# Patient Record
Sex: FEMALE | Race: WHITE | Employment: OTHER | URBAN - METROPOLITAN AREA
[De-identification: names, ages, dates, MRNs, and addresses within clinical notes are randomized per-mention and may not be internally consistent; named-entity substitution may affect disease eponyms.]

---

## 2019-06-26 ENCOUNTER — HOSPITAL ENCOUNTER (OUTPATIENT)
Dept: MRI IMAGING | Age: 59
Discharge: HOME | End: 2019-06-26
Attending: OTOLARYNGOLOGY
Payer: COMMERCIAL

## 2019-06-26 DIAGNOSIS — H90.5 SNHL (SENSORINEURAL HEARING LOSS): ICD-10-CM

## 2019-06-26 DIAGNOSIS — G43.109 MIGRAINE WITH VERTIGO: ICD-10-CM

## 2019-06-26 DIAGNOSIS — R51.9 HEADACHE: ICD-10-CM

## 2019-06-26 PROCEDURE — 74011250636 HC RX REV CODE- 250/636: Performed by: OTOLARYNGOLOGY

## 2019-06-26 PROCEDURE — A9575 INJ GADOTERATE MEGLUMI 0.1ML: HCPCS | Performed by: OTOLARYNGOLOGY

## 2019-06-26 PROCEDURE — 70553 MRI BRAIN STEM W/O & W/DYE: CPT

## 2019-06-26 RX ORDER — GADOTERATE MEGLUMINE 376.9 MG/ML
13 INJECTION INTRAVENOUS
Status: COMPLETED | OUTPATIENT
Start: 2019-06-26 | End: 2019-06-26

## 2019-06-26 RX ADMIN — GADOTERATE MEGLUMINE 15 ML: 376.9 INJECTION INTRAVENOUS at 16:42

## 2022-06-08 ENCOUNTER — HOSPITAL ENCOUNTER (INPATIENT)
Age: 62
LOS: 2 days | Discharge: HOME | DRG: 885 | End: 2022-06-10
Attending: EMERGENCY MEDICINE | Admitting: PSYCHIATRY & NEUROLOGY
Payer: COMMERCIAL

## 2022-06-08 DIAGNOSIS — F33.2 SEVERE EPISODE OF RECURRENT MAJOR DEPRESSIVE DISORDER, WITHOUT PSYCHOTIC FEATURES (HCC): ICD-10-CM

## 2022-06-08 DIAGNOSIS — F41.1 GENERALIZED ANXIETY DISORDER: Primary | ICD-10-CM

## 2022-06-08 PROBLEM — F32.A DEPRESSED: Status: ACTIVE | Noted: 2022-06-08

## 2022-06-08 LAB
ALBUMIN SERPL-MCNC: 3.6 G/DL (ref 3.5–5)
ALBUMIN/GLOB SERPL: 0.9 {RATIO} (ref 1.1–2.2)
ALP SERPL-CCNC: 151 U/L (ref 45–117)
ALT SERPL-CCNC: 171 U/L (ref 12–78)
AMPHET UR QL SCN: NEGATIVE
ANION GAP SERPL CALC-SCNC: 9 MMOL/L (ref 5–15)
APAP SERPL-MCNC: <2 UG/ML (ref 10–30)
APPEARANCE UR: CLEAR
AST SERPL-CCNC: 64 U/L (ref 15–37)
BARBITURATES UR QL SCN: NEGATIVE
BASOPHILS # BLD: 0 K/UL (ref 0–0.1)
BASOPHILS NFR BLD: 0 % (ref 0–1)
BENZODIAZ UR QL: NEGATIVE
BILIRUB SERPL-MCNC: 0.4 MG/DL (ref 0.2–1)
BILIRUB UR QL: NEGATIVE
BUN SERPL-MCNC: 6 MG/DL (ref 6–20)
BUN/CREAT SERPL: 7 (ref 12–20)
CALCIUM SERPL-MCNC: 9 MG/DL (ref 8.5–10.1)
CANNABINOIDS UR QL SCN: NEGATIVE
CHLORIDE SERPL-SCNC: 105 MMOL/L (ref 97–108)
CO2 SERPL-SCNC: 26 MMOL/L (ref 21–32)
COCAINE UR QL SCN: NEGATIVE
COLOR UR: NORMAL
CREAT SERPL-MCNC: 0.84 MG/DL (ref 0.55–1.02)
DIFFERENTIAL METHOD BLD: ABNORMAL
DRUG SCRN COMMENT,DRGCM: NORMAL
EOSINOPHIL # BLD: 0 K/UL (ref 0–0.4)
EOSINOPHIL NFR BLD: 0 % (ref 0–7)
ERYTHROCYTE [DISTWIDTH] IN BLOOD BY AUTOMATED COUNT: 14.7 % (ref 11.5–14.5)
ETHANOL SERPL-MCNC: <10 MG/DL
FLUAV RNA SPEC QL NAA+PROBE: NOT DETECTED
FLUBV RNA SPEC QL NAA+PROBE: NOT DETECTED
GLOBULIN SER CALC-MCNC: 3.8 G/DL (ref 2–4)
GLUCOSE SERPL-MCNC: 89 MG/DL (ref 65–100)
GLUCOSE UR STRIP.AUTO-MCNC: NEGATIVE MG/DL
HCT VFR BLD AUTO: 41 % (ref 35–47)
HGB BLD-MCNC: 13.8 G/DL (ref 11.5–16)
HGB UR QL STRIP: NEGATIVE
IMM GRANULOCYTES # BLD AUTO: 0 K/UL (ref 0–0.04)
IMM GRANULOCYTES NFR BLD AUTO: 0 % (ref 0–0.5)
KETONES UR QL STRIP.AUTO: NEGATIVE MG/DL
LEUKOCYTE ESTERASE UR QL STRIP.AUTO: NEGATIVE
LYMPHOCYTES # BLD: 3.5 K/UL (ref 0.8–3.5)
LYMPHOCYTES NFR BLD: 45 % (ref 12–49)
MCH RBC QN AUTO: 29.9 PG (ref 26–34)
MCHC RBC AUTO-ENTMCNC: 33.7 G/DL (ref 30–36.5)
MCV RBC AUTO: 88.7 FL (ref 80–99)
METHADONE UR QL: NEGATIVE
MONOCYTES # BLD: 0.4 K/UL (ref 0–1)
MONOCYTES NFR BLD: 5 % (ref 5–13)
NEUTS SEG # BLD: 3.8 K/UL (ref 1.8–8)
NEUTS SEG NFR BLD: 50 % (ref 32–75)
NITRITE UR QL STRIP.AUTO: NEGATIVE
NRBC # BLD: 0 K/UL (ref 0–0.01)
NRBC BLD-RTO: 0 PER 100 WBC
OPIATES UR QL: NEGATIVE
PCP UR QL: NEGATIVE
PH UR STRIP: 6.5 [PH] (ref 5–8)
PLATELET # BLD AUTO: 255 K/UL (ref 150–400)
PMV BLD AUTO: 8.9 FL (ref 8.9–12.9)
POTASSIUM SERPL-SCNC: 3.9 MMOL/L (ref 3.5–5.1)
PROT SERPL-MCNC: 7.4 G/DL (ref 6.4–8.2)
PROT UR STRIP-MCNC: NEGATIVE MG/DL
RBC # BLD AUTO: 4.62 M/UL (ref 3.8–5.2)
RBC MORPH BLD: ABNORMAL
SALICYLATES SERPL-MCNC: 5.3 MG/DL (ref 2.8–20)
SARS-COV-2, COV2: NOT DETECTED
SODIUM SERPL-SCNC: 140 MMOL/L (ref 136–145)
SP GR UR REFRACTOMETRY: <1.005 (ref 1–1.03)
UROBILINOGEN UR QL STRIP.AUTO: 0.2 EU/DL (ref 0.2–1)
WBC # BLD AUTO: 7.7 K/UL (ref 3.6–11)

## 2022-06-08 PROCEDURE — 90791 PSYCH DIAGNOSTIC EVALUATION: CPT

## 2022-06-08 PROCEDURE — 81003 URINALYSIS AUTO W/O SCOPE: CPT

## 2022-06-08 PROCEDURE — 80053 COMPREHEN METABOLIC PANEL: CPT

## 2022-06-08 PROCEDURE — 85025 COMPLETE CBC W/AUTO DIFF WBC: CPT

## 2022-06-08 PROCEDURE — 80143 DRUG ASSAY ACETAMINOPHEN: CPT

## 2022-06-08 PROCEDURE — 80179 DRUG ASSAY SALICYLATE: CPT

## 2022-06-08 PROCEDURE — 65220000003 HC RM SEMIPRIVATE PSYCH

## 2022-06-08 PROCEDURE — 99285 EMERGENCY DEPT VISIT HI MDM: CPT

## 2022-06-08 PROCEDURE — 74011250637 HC RX REV CODE- 250/637: Performed by: EMERGENCY MEDICINE

## 2022-06-08 PROCEDURE — 80307 DRUG TEST PRSMV CHEM ANLYZR: CPT

## 2022-06-08 PROCEDURE — 74011250637 HC RX REV CODE- 250/637: Performed by: PSYCHIATRY & NEUROLOGY

## 2022-06-08 PROCEDURE — 82077 ASSAY SPEC XCP UR&BREATH IA: CPT

## 2022-06-08 PROCEDURE — 36415 COLL VENOUS BLD VENIPUNCTURE: CPT

## 2022-06-08 PROCEDURE — 87636 SARSCOV2 & INF A&B AMP PRB: CPT

## 2022-06-08 RX ORDER — IBUPROFEN 200 MG
1 TABLET ORAL
Status: DISCONTINUED | OUTPATIENT
Start: 2022-06-08 | End: 2022-06-09

## 2022-06-08 RX ORDER — CITALOPRAM 40 MG/1
40 TABLET, FILM COATED ORAL DAILY
Status: ON HOLD | COMMUNITY
Start: 2022-05-26 | End: 2022-06-10 | Stop reason: SDUPTHER

## 2022-06-08 RX ORDER — PANTOPRAZOLE SODIUM 40 MG/1
40 TABLET, DELAYED RELEASE ORAL DAILY
Status: ON HOLD | COMMUNITY
Start: 2022-05-20 | End: 2022-06-10 | Stop reason: SDUPTHER

## 2022-06-08 RX ORDER — DIPHENHYDRAMINE HYDROCHLORIDE 50 MG/ML
50 INJECTION, SOLUTION INTRAMUSCULAR; INTRAVENOUS
Status: DISCONTINUED | OUTPATIENT
Start: 2022-06-08 | End: 2022-06-10 | Stop reason: HOSPADM

## 2022-06-08 RX ORDER — BENZTROPINE MESYLATE 1 MG/1
1 TABLET ORAL
Status: DISCONTINUED | OUTPATIENT
Start: 2022-06-08 | End: 2022-06-10 | Stop reason: HOSPADM

## 2022-06-08 RX ORDER — HYDROXYZINE 25 MG/1
50 TABLET, FILM COATED ORAL
Status: DISCONTINUED | OUTPATIENT
Start: 2022-06-08 | End: 2022-06-10 | Stop reason: HOSPADM

## 2022-06-08 RX ORDER — ATORVASTATIN CALCIUM 20 MG/1
20 TABLET, FILM COATED ORAL DAILY
Status: ON HOLD | COMMUNITY
Start: 2022-03-03 | End: 2022-06-09

## 2022-06-08 RX ORDER — LORAZEPAM 1 MG/1
1 TABLET ORAL
Status: COMPLETED | OUTPATIENT
Start: 2022-06-08 | End: 2022-06-08

## 2022-06-08 RX ORDER — ACETAMINOPHEN 325 MG/1
650 TABLET ORAL
Status: DISCONTINUED | OUTPATIENT
Start: 2022-06-08 | End: 2022-06-10 | Stop reason: HOSPADM

## 2022-06-08 RX ORDER — LORAZEPAM 2 MG/ML
1 INJECTION INTRAMUSCULAR
Status: DISCONTINUED | OUTPATIENT
Start: 2022-06-08 | End: 2022-06-10 | Stop reason: HOSPADM

## 2022-06-08 RX ORDER — OLANZAPINE 2.5 MG/1
5 TABLET ORAL
Status: DISCONTINUED | OUTPATIENT
Start: 2022-06-08 | End: 2022-06-10 | Stop reason: HOSPADM

## 2022-06-08 RX ORDER — HALOPERIDOL 5 MG/ML
5 INJECTION INTRAMUSCULAR
Status: DISCONTINUED | OUTPATIENT
Start: 2022-06-08 | End: 2022-06-10 | Stop reason: HOSPADM

## 2022-06-08 RX ORDER — BACLOFEN 20 MG/1
20 TABLET ORAL AS NEEDED
Status: ON HOLD | COMMUNITY
Start: 2022-04-20 | End: 2022-06-09

## 2022-06-08 RX ORDER — TRAZODONE HYDROCHLORIDE 50 MG/1
50 TABLET ORAL
Status: DISCONTINUED | OUTPATIENT
Start: 2022-06-08 | End: 2022-06-10 | Stop reason: HOSPADM

## 2022-06-08 RX ORDER — ADHESIVE BANDAGE
30 BANDAGE TOPICAL DAILY PRN
Status: DISCONTINUED | OUTPATIENT
Start: 2022-06-08 | End: 2022-06-10 | Stop reason: HOSPADM

## 2022-06-08 RX ORDER — CLONAZEPAM 0.5 MG/1
0.5 TABLET ORAL AS DIRECTED
Status: ON HOLD | COMMUNITY
End: 2022-06-10 | Stop reason: SDUPTHER

## 2022-06-08 RX ORDER — IBUPROFEN 200 MG
1 TABLET ORAL DAILY
Status: DISCONTINUED | OUTPATIENT
Start: 2022-06-09 | End: 2022-06-08

## 2022-06-08 RX ADMIN — LORAZEPAM 1 MG: 1 TABLET ORAL at 14:26

## 2022-06-08 RX ADMIN — HYDROXYZINE HYDROCHLORIDE 50 MG: 25 TABLET, FILM COATED ORAL at 22:08

## 2022-06-08 RX ADMIN — TRAZODONE HYDROCHLORIDE 50 MG: 50 TABLET ORAL at 22:08

## 2022-06-08 NOTE — ED NOTES
TRANSFER - OUT REPORT:    Verbal report given to Carlos Martinez on Kelly Carol Ann  being transferred to [facility] for routine progression of care       Report consisted of patients Situation, Background, Assessment and   Recommendations(SBAR). Information from the following report(s) SBAR, Kardex, ED Summary, Procedure Summary, Intake/Output, MAR, Accordion and Med Rec Status was reviewed with the receiving nurse. Lines:       Opportunity for questions and clarification was provided.       Patient transported with:

## 2022-06-08 NOTE — BH NOTES
TRANSFER - IN REPORT:     Verbal report received from Alyssa Pereyra RN on Kendall Rivera being transferred from [facility] ER for routine progression of care        Report consisted of patients Situation, Background, Assessment and   Recommendations(SBAR). Information from the following reports SBAR, Kardex, ED Summary, MAR, Recent Results and Med Rec Status was reviewed with the receiving nurse.

## 2022-06-08 NOTE — ED TRIAGE NOTES
Pt reports increasing anxiety over the last 3 weeks. She states that her PCP has added atarax yesterday but that it is not helping. She reports extreme anxiety at this time. Denies any SI or HI, no hallucinations. Pt is anxious, but cooperative.

## 2022-06-08 NOTE — ED NOTES
Report from [facility] at [facility], pt to go to [address] [place] [address] Unit Bed 563G    Number for report - [phone] No

## 2022-06-08 NOTE — BSMART NOTE
BSmart aware of consultation, will address upon completion of another evaluation. Adriano Watson LCSW.

## 2022-06-08 NOTE — BSMART NOTE
BSMART assessment completed, and suicide risk level noted to be moderate. Primary Nurse Saman Castaneda who is also serving as the Charge Nurse and Physician Dr. Sherrill Reddy notified. Concerns not observed. Security/Off- has not been notified.     Ame Huffman, [facility]

## 2022-06-08 NOTE — BSMART NOTE
Comprehensive Assessment Form Part 1      Section I - Disposition    Depression and Anxiety  No past medical history on file. The Medical Doctor to Psychiatrist conference was not completed. The Medical Doctor is in agreement with Psychiatrist disposition because of (reason) patient meets criteria for vol inpatient admission. The plan is medical clearance and vol inpatient admission. Access notified. The on-call Psychiatrist consulted was  .  The admitting Psychiatrist will be Dr. Manjit Hardy. The admitting Diagnosis is Depression and anxiety. The Payor source is [facility] [id number]. This writer reviewed the [address] in nursing flowsheet and the risk level assigned is Moderate risk. Based on this assessment, the risk of suicide is moderate risk and the plan is medical clearance and vol inpatient admission. Section II - Integrated Summary  Summary:  Patient seen in room 7 of the ER. Patient alert and oriented, pleasant and engaging. Patient appeared anxious and overwhelmed AEB her expressions. She indicated that she had been on the same medication regimen managed by her PCP for the past 20 years and recently she had a virus about 2-3 weeks ago that brought about a change in her mental health stability that she has struggled to get over. Patient noted that she has been constantly shaking, panicky, had decreased eating, pacing, and had disrupted sleeping patterns making it difficult for her to function. Patient expressed that she went to the ER twice in the past week and received IV fluids for being dehydrated but nothing else was going on with her medically. Patient indicated that she has tried coping techniques but nothing is helping at this point. She further noted that her doctor added a new medication on yesterday and she has had 3 doses already but had not seen any changes thus far.  Patient stated can recall one prior instance years ago when she was unstable and the only thing that helped was an inpatient stay. Patient expressed that at this point, she feels that she is threat to herself and indicated, \"I don't want to live like this. I didn't want to overtake my meds but I need to get some help. I can't do this by myself. \" Patient is willing to stay for vol inpatient treatment. The patienthas demonstrated mental capacity to provide informed consent. The information is given by the patient. The Chief Complaint is increased anxiety. The Precipitant Factors are disruption to normal daily stability and functioning. Previous Hospitalizations: 1- [facility]  The patient has not previously been in restraints. Current Psychiatrist and/or  is none. Lethality Assessment:    The potential for suicide noted by the following: ideation . The potential for homicide is not noted. The patient has not been a perpetrator of sexual or physical abuse. There are not pending charges. The patient is felt to be at risk for self harm or harm to others. The attending nurse was advised to remove potentially harmful or dangerous items from the patient's room , to remove patient clothing and place it out of immediate access to the patient and the patient needs supervision. Section III - Psychosocial  The patient's overall mood and attitude is calm and cooperative. Feelings of helplessness and hopelessness are not observed. Generalized anxiety is observed by verbalizations. Panic is not observed. Phobias are not observed. Obsessive compulsive tendencies are not observed. Section IV - Mental Status Exam  The patient's appearance shows no evidence of impairment. The patient's behavior shows no evidence of impairment. The patient is oriented to time, place, person and situation. The patient's speech shows no evidence of impairment. The patient's mood is anxious. The range of affect shows no evidence of impairment.   The patient's thought content demonstrates no evidence of impairment. The thought process shows no evidence of impairment. The patient's perception shows no evidence of impairment. The patient's memory shows no evidence of impairment. The patient's appetite is decreased. The patient's sleep shows no evidence of impairment. The patient's insight shows no evidence of impairment. The patient's judgement shows no evidence of impairment. Section V - Substance Abuse  The patient is not using substances. Section VI - Living Arrangements  The patient is . The patient lives with a spouse. The patient has no children. The patient does plan to return home upon discharge. The patient does not have legal issues pending. The patient's source of income comes from unknown. Jainism and cultural practices have not been voiced at this time. The patient's greatest support comes from family and this person will be involved with the treatment. The patient has not been in an event described as horrible or outside the realm of ordinary life experience either currently or in the past.  The patient has not been a victim of sexual/physical abuse. Section VII - Other Areas of Clinical Concern  The highest grade achieved is unknown with the overall quality of school experience being described as unknown. The patient speaks [place] as a primary language. The patient has no communication impairments affecting communication. The patient's preference for learning can be described as: can read and write adequately.   The patient's hearing is normal.  The patient's vision is normal.      [facility]io [facility]

## 2022-06-08 NOTE — ED NOTES
Jen Ta at [facility] reports that the pt is a moderate risk d/t columbia scale. Pt is voluntary to stay.  Jen Ta is speaking with bedboard and Dr Nestor Ta

## 2022-06-08 NOTE — ED PROVIDER NOTES
EMERGENCY DEPARTMENT HISTORY AND PHYSICAL EXAM          Date: 6/8/2022  Patient Name: Tamia Jaimes    History of Presenting Illness     Chief Complaint   Patient presents with    Anxiety       History Provided By: Patient    HPI: Tamia Jaimes is a 58 y.o. female, pmhx listed below, who presents to the ED c/o severe anxiety. Patient reports severe anxiety for the last 3 weeks. States started with a viral infection and then subsequently she has felt constantly anxious/depressed. Reports she had a similar episode about 20 years ago and was started on Celexa and Klonopin at that time, continues to take both medications daily. States the anxiety is so severe she \"cannot live like this anymore\". Reports this is her third ER visit for similar symptoms. Spoke with her primary care doctor about symptoms and was prescribed hydroxyzine but has not had any relief since taking yesterday. Denies suicidal ideation or homicidal ideation. Denies hallucinations. PCP: Warren Tamayo MD    There are no other complaints, changes, or physical findings at this time. Past History       Past Medical History:  No past medical history on file. Past Surgical History:  No past surgical history on file. Family History:  No family history on file. Social History:  Social History     Tobacco Use    Smoking status: Not on file    Smokeless tobacco: Not on file   Substance Use Topics    Alcohol use: Not on file    Drug use: Not on file       Current Facility-Administered Medications   Medication Dose Route Frequency Provider Last Rate Last Admin    nicotine (NICODERM CQ) 14 mg/24 hr patch 1 Patch  1 Patch TransDERmal NOW Sally Bellamy MD   1 Patch at 06/08/22 8301     Current Outpatient Medications   Medication Sig Dispense Refill    atorvastatin (LIPITOR) 20 mg tablet Take 20 mg by mouth daily.  baclofen (LIORESAL) 20 mg tablet Take 20 mg by mouth as needed.       citalopram (CELEXA) 40 mg tablet Take 40 mg by mouth daily.  clonazePAM (KlonoPIN) 0.5 mg tablet Take 0.5 mg by mouth as directed.  pantoprazole (PROTONIX) 40 mg tablet Take 40 mg by mouth daily. Allergies:  No Known Allergies      Review of Systems   Review of Systems   Constitutional: Negative for chills and fever. HENT: Negative for congestion. Eyes: Negative for pain. Respiratory: Negative for shortness of breath. Cardiovascular: Negative for chest pain. Gastrointestinal: Negative for abdominal pain. Genitourinary: Negative for flank pain. Musculoskeletal: Negative for back pain. Neurological: Negative for headaches. Psychiatric/Behavioral: Positive for agitation. The patient is nervous/anxious. Physical Exam     Vital Signs-Reviewed the patient's vital signs. Patient Vitals for the past 12 hrs:   Temp Pulse Resp BP SpO2   06/08/22 1745 98.3 °F (36.8 °C) 81 18 129/75 98 %   06/08/22 1356 -- 82 -- 127/79 99 %   06/08/22 1134 98.3 °F (36.8 °C) 100 22 119/74 97 %       Physical Exam  Constitutional:       Appearance: Normal appearance. HENT:      Head: Normocephalic and atraumatic. Mouth/Throat:      Mouth: Mucous membranes are moist.   Eyes:      Pupils: Pupils are equal, round, and reactive to light. Cardiovascular:      Rate and Rhythm: Normal rate and regular rhythm. Pulmonary:      Effort: Pulmonary effort is normal.      Breath sounds: Normal breath sounds. Abdominal:      Tenderness: There is no abdominal tenderness. Musculoskeletal:         General: No swelling. Skin:     General: Skin is warm and dry. Neurological:      Mental Status: She is alert and oriented to person, place, and time. Psychiatric:         Mood and Affect: Mood is anxious. Speech: Speech is rapid and pressured. Behavior: Behavior is not agitated. Behavior is cooperative. Thought Content:  Thought content is not paranoid or delusional. Thought content does not include homicidal or suicidal ideation. Diagnostic Study Results     Labs -     Recent Results (from the past 12 hour(s))   CBC WITH AUTOMATED DIFF    Collection Time: 06/08/22 11:39 AM   Result Value Ref Range    WBC 7.7 3.6 - 11.0 K/uL    RBC 4.62 3.80 - 5.20 M/uL    HGB 13.8 11.5 - 16.0 g/dL    HCT 41.0 35.0 - 47.0 %    MCV 88.7 80.0 - 99.0 FL    MCH 29.9 26.0 - 34.0 PG    MCHC 33.7 30.0 - 36.5 g/dL    RDW 14.7 (H) 11.5 - 14.5 %    PLATELET 911 295 - 529 K/uL    MPV 8.9 8.9 - 12.9 FL    NRBC 0.0 0  WBC    ABSOLUTE NRBC 0.00 0.00 - 0.01 K/uL    NEUTROPHILS 50 32 - 75 %    LYMPHOCYTES 45 12 - 49 %    MONOCYTES 5 5 - 13 %    EOSINOPHILS 0 0 - 7 %    BASOPHILS 0 0 - 1 %    IMMATURE GRANULOCYTES 0 0.0 - 0.5 %    ABS. NEUTROPHILS 3.8 1.8 - 8.0 K/UL    ABS. LYMPHOCYTES 3.5 0.8 - 3.5 K/UL    ABS. MONOCYTES 0.4 0.0 - 1.0 K/UL    ABS. EOSINOPHILS 0.0 0.0 - 0.4 K/UL    ABS. BASOPHILS 0.0 0.0 - 0.1 K/UL    ABS. IMM. GRANS. 0.0 0.00 - 0.04 K/UL    DF MANUAL      RBC COMMENTS NORMOCYTIC, NORMOCHROMIC     METABOLIC PANEL, COMPREHENSIVE    Collection Time: 06/08/22 11:39 AM   Result Value Ref Range    Sodium 140 136 - 145 mmol/L    Potassium 3.9 3.5 - 5.1 mmol/L    Chloride 105 97 - 108 mmol/L    CO2 26 21 - 32 mmol/L    Anion gap 9 5 - 15 mmol/L    Glucose 89 65 - 100 mg/dL    BUN 6 6 - 20 MG/DL    Creatinine 0.84 0.55 - 1.02 MG/DL    BUN/Creatinine ratio 7 (L) 12 - 20      GFR est AA >60 >60 ml/min/1.73m2    GFR est non-AA >60 >60 ml/min/1.73m2    Calcium 9.0 8.5 - 10.1 MG/DL    Bilirubin, total 0.4 0.2 - 1.0 MG/DL    ALT (SGPT) 171 (H) 12 - 78 U/L    AST (SGOT) 64 (H) 15 - 37 U/L    Alk.  phosphatase 151 (H) 45 - 117 U/L    Protein, total 7.4 6.4 - 8.2 g/dL    Albumin 3.6 3.5 - 5.0 g/dL    Globulin 3.8 2.0 - 4.0 g/dL    A-G Ratio 0.9 (L) 1.1 - 2.2     ETHYL ALCOHOL    Collection Time: 06/08/22 11:39 AM   Result Value Ref Range    ALCOHOL(ETHYL),SERUM <10 <10 MG/DL   ACETAMINOPHEN    Collection Time: 06/08/22 11:39 AM Result Value Ref Range    Acetaminophen level <2 (L) 10 - 30 ug/mL   SALICYLATE    Collection Time: 06/08/22 11:39 AM   Result Value Ref Range    Salicylate level 5.3 2.8 - 20.0 MG/DL   URINALYSIS W/ RFLX MICROSCOPIC    Collection Time: 06/08/22 11:40 AM   Result Value Ref Range    Color YELLOW/STRAW      Appearance CLEAR CLEAR      Specific gravity <1.005 1.003 - 1.030    pH (UA) 6.5 5.0 - 8.0      Protein Negative NEG mg/dL    Glucose Negative NEG mg/dL    Ketone Negative NEG mg/dL    Bilirubin Negative NEG      Blood Negative NEG      Urobilinogen 0.2 0.2 - 1.0 EU/dL    Nitrites Negative NEG      Leukocyte Esterase Negative NEG     DRUG SCREEN, URINE    Collection Time: 06/08/22 11:40 AM   Result Value Ref Range    AMPHETAMINES Negative NEG      BARBITURATES Negative NEG      BENZODIAZEPINES Negative NEG      COCAINE Negative NEG      METHADONE Negative NEG      OPIATES Negative NEG      PCP(PHENCYCLIDINE) Negative NEG      THC (TH-CANNABINOL) Negative NEG      Drug screen comment (NOTE)    COVID-19 WITH INFLUENZA A/B    Collection Time: 06/08/22 11:55 AM   Result Value Ref Range    SARS-CoV-2 by PCR Not detected NOTD      Influenza A by PCR Not detected NOTD      Influenza B by PCR Not detected NOTD         Radiologic Studies -   No orders to display     CT Results  (Last 48 hours)    None        CXR Results  (Last 48 hours)    None            Medical Decision Making   I am the first provider for this patient. I reviewed the vital signs, available nursing notes, past medical history, past surgical history, family history and social history. Records Reviewed: Nursing Notes and Old Medical Records    Provider Notes (Medical Decision Making):   MDM: 69-year-old female with severe and worsening anxiety despite her regular medications and addition of hydroxyzine. Patient is to a point of distress.   While she does not endorse suicidal ideation, she endorses passive suicidal thoughts such as \"I cannot go on like this\" and \"I cannot live like this\". We will plan for medical clearance now and psychiatric evaluation. Disposition pending psych eval.    Initial assessment performed. The patients presenting problems have been discussed, and they are in agreement with the care plan formulated and outlined with them. I have encouraged them to ask questions as they arise throughout their visit. PROGRESS NOTE:  ED Course as of 06/08/22 1854   Wed Jun 08, 2022   1243 Medically cleared. Awaiting BSMART consult. [PV]   2008 Patient is willing to stay voluntarily for admission. BSMART is currently searching for placement options for patient. [PV]   4511 Patient likely to be admitted to [facility] here at [facility]. Awaiting bed assignment. [PV]      ED Course User Index  [PV] Hui Peterson MD      Patient is awaiting transfer to [facility] where she was accepted for admission. Diagnosis     Clinical Impression:   1. Generalized anxiety disorder            Disposition:      Current Discharge Medication List            Please note, this dictation was completed with [employer], the computer voice recognition software. Quite often unanticipated grammatical, syntax, homophones, and other interpretive errors are inadvertently transcribed by the computer software. Please disregard these errors. Please excuse any errors that have escaped final proof reading.

## 2022-06-08 NOTE — BSMART NOTE
Writer [facility] Nurse Gurinder Harrison and Dr Fadumo Blancass that patient will be presented to access for vol inpatient. Will follow up when further information is received from Access. Presented patient to Akosua Lee in [facility] at 13:55pm and awaiting follow up.      Ritesh Prakash, [facility]

## 2022-06-08 NOTE — PROGRESS NOTES
[address] [facility] ED RN advises to arranged BLS transportation from [facility] 7 to [facility] #[id number]. Arranged BLS transport w/[facility] Lino) - ETA will be called back by [facility] - updated ED staff w/ETA    [phone] - Lisa bedboard called to advise that pt will be admitted to [facility] rather than [facility] - called [facility] to cancel transport reservation.

## 2022-06-09 PROBLEM — R79.89 ABNORMAL LFTS: Status: ACTIVE | Noted: 2022-06-09

## 2022-06-09 PROBLEM — R63.0 ANOREXIA: Status: ACTIVE | Noted: 2022-06-09

## 2022-06-09 PROBLEM — F17.210 CIGARETTE NICOTINE DEPENDENCE: Status: ACTIVE | Noted: 2022-06-09

## 2022-06-09 PROBLEM — F33.2 SEVERE EPISODE OF RECURRENT MAJOR DEPRESSIVE DISORDER, WITHOUT PSYCHOTIC FEATURES (HCC): Status: ACTIVE | Noted: 2022-06-09

## 2022-06-09 PROBLEM — F32.A DEPRESSED: Status: RESOLVED | Noted: 2022-06-08 | Resolved: 2022-06-09

## 2022-06-09 PROBLEM — F17.210 CIGARETTE NICOTINE DEPENDENCE: Chronic | Status: ACTIVE | Noted: 2022-06-09

## 2022-06-09 PROCEDURE — 74011250637 HC RX REV CODE- 250/637: Performed by: PSYCHIATRY & NEUROLOGY

## 2022-06-09 PROCEDURE — 99223 1ST HOSP IP/OBS HIGH 75: CPT | Performed by: PSYCHIATRY & NEUROLOGY

## 2022-06-09 PROCEDURE — 65220000003 HC RM SEMIPRIVATE PSYCH

## 2022-06-09 RX ORDER — IBUPROFEN 200 MG
1 TABLET ORAL DAILY
Status: DISCONTINUED | OUTPATIENT
Start: 2022-06-09 | End: 2022-06-09

## 2022-06-09 RX ORDER — GABAPENTIN 300 MG/1
300 CAPSULE ORAL
Status: DISCONTINUED | OUTPATIENT
Start: 2022-06-09 | End: 2022-06-10 | Stop reason: HOSPADM

## 2022-06-09 RX ORDER — PANTOPRAZOLE SODIUM 40 MG/1
40 TABLET, DELAYED RELEASE ORAL
Status: DISCONTINUED | OUTPATIENT
Start: 2022-06-09 | End: 2022-06-10 | Stop reason: HOSPADM

## 2022-06-09 RX ORDER — BUSPIRONE HYDROCHLORIDE 5 MG/1
5 TABLET ORAL 3 TIMES DAILY
Status: DISCONTINUED | OUTPATIENT
Start: 2022-06-09 | End: 2022-06-10 | Stop reason: HOSPADM

## 2022-06-09 RX ORDER — IBUPROFEN 200 MG
1 TABLET ORAL DAILY
Status: DISCONTINUED | OUTPATIENT
Start: 2022-06-09 | End: 2022-06-10 | Stop reason: HOSPADM

## 2022-06-09 RX ORDER — CLONAZEPAM 0.5 MG/1
0.5 TABLET ORAL
Status: DISCONTINUED | OUTPATIENT
Start: 2022-06-09 | End: 2022-06-10 | Stop reason: HOSPADM

## 2022-06-09 RX ORDER — CITALOPRAM 20 MG/1
40 TABLET, FILM COATED ORAL DAILY
Status: DISCONTINUED | OUTPATIENT
Start: 2022-06-09 | End: 2022-06-10 | Stop reason: HOSPADM

## 2022-06-09 RX ADMIN — CITALOPRAM HYDROBROMIDE 40 MG: 20 TABLET ORAL at 14:39

## 2022-06-09 RX ADMIN — CLONAZEPAM 0.5 MG: 0.5 TABLET ORAL at 21:15

## 2022-06-09 RX ADMIN — HYDROXYZINE HYDROCHLORIDE 50 MG: 25 TABLET, FILM COATED ORAL at 08:05

## 2022-06-09 RX ADMIN — BUSPIRONE HYDROCHLORIDE 5 MG: 5 TABLET ORAL at 16:49

## 2022-06-09 RX ADMIN — TRAZODONE HYDROCHLORIDE 50 MG: 50 TABLET ORAL at 21:18

## 2022-06-09 RX ADMIN — PANTOPRAZOLE SODIUM 40 MG: 40 TABLET, DELAYED RELEASE ORAL at 14:40

## 2022-06-09 RX ADMIN — BUSPIRONE HYDROCHLORIDE 5 MG: 5 TABLET ORAL at 21:15

## 2022-06-09 NOTE — PROGRESS NOTES
Problem: Falls - Risk of  Goal: *Absence of Falls  Description: Document Marciana Distance Fall Risk and appropriate interventions in the flowsheet.   Outcome: Progressing Towards Goal  Note: Fall Risk Interventions:     Medication Interventions: Teach patient to arise slowly      Problem: Depressed Mood (Adult/Pediatric)  Goal: *STG: Remains safe in hospital  Outcome: Progressing Towards Goal     Problem: Tobacco Use  Goal: *Knowledge of tobacco use cessation methods  Outcome: Progressing Towards Goal

## 2022-06-09 NOTE — BH NOTES
Admission Note:     Patient arrived on the unit @ 1956 on a VOL admission from [facility] ER ER. Patient was escorted on the unit by [facility] via wheelchair. Dr. Nelson Morse and Nursing Supervisor were notified of patient's arrival.  Hospitalist Dr. Jackie Ibarra ,  [employer] d/t Medical H&P. Patient is a smoker. Patient alert & oriented to person, and time. Patient denies SI/HI/VH/pain. Denies having [address] license. Patient anxious/depressed. No agitation or aggression noted. Care Plan started.

## 2022-06-09 NOTE — BH NOTES
Patient's affect dull/tearful, mood depressed/anxious. Alert and oriented x 4. Cooperative and guarded. Denies SI/HI/AVH and pain. Medication compliant. Patient stable with no s/s of distress. Rested in bed with eyes closed for 6 hours.     PRN Medication Documentation    Specific patient behavior that led to the need for PRN medication: anxiety/restlessness  Staff interventions attempted prior to PRN being given: patient requested  PRN medication given: Trazodone 50 mg PO/Atarax 50 mg PO @2208  Patient responsiveness/effectiveness of PRN medication: effective

## 2022-06-09 NOTE — PROGRESS NOTES
Behavioral Services  Medicare Certification Upon Admission    I certify that this patient's inpatient psychiatric hospital admission is medically necessary for:    [x] (1) Treatment which could reasonably be expected to improve this patient's condition,       [x] (2) Or for diagnostic study;     AND     [x](2) The inpatient psychiatric services are provided while the individual is under the care of a physician and are included in the individualized plan of care.     Estimated length of stay/service 5-7 days    Plan for post-hospital care home    Electronically signed by Kareem Childs MD on 6/8/2022 at 8:13 PM

## 2022-06-09 NOTE — PROGRESS NOTES
Problem: Anxiety  Goal: *Alleviation of anxiety  Outcome: Progressing Towards Goal     Problem: Falls - Risk of  Goal: *Absence of Falls  Description: Document Deedee Li Fall Risk and appropriate interventions in the flowsheet.   Outcome: Progressing Towards Goal  Note: Fall Risk Interventions:            Medication Interventions: Teach patient to arise slowly

## 2022-06-09 NOTE — BH NOTES
Patient was admitted voluntarily to unit for worsening anxiety. Patient has been seeing her PCP and new medication has not been helping. She has the same event happen 20 years ago which resulted in her first hospitalization. She plans on returning home and she will follow up with [address]. She has a supportive family and she is able to care for herself. Patient has been compliant and appropriate.

## 2022-06-09 NOTE — ROUTINE PROCESS
Shift change report given to Merry Bowens RN. Re; SBAR, medications, and behavior.    COY fall risk score; 2

## 2022-06-09 NOTE — BH NOTES
Affect blunted mood anxious. Patient has been interacting appropriately with peers and staff. Denies SI/HI. Attended and participated in group. Medication compliant.

## 2022-06-09 NOTE — GROUP NOTE
KEILA  GROUP DOCUMENTATION INDIVIDUAL                                                                          Group Therapy Note    Date: 6/9/2022    Group Start Time: 0900  Group End Time: 7902  Group Topic: Process Group - Inpatient    [facility]    [place], [address] [facility] [address] [facility] GROUP DOCUMENTATION GROUP    Group Therapy Note    Focus of session was on developing healthy boundaries with others and the signs of healthy and unhealthy boundaries. We explored current relationships with group members and identified what kinds of boundaries are present and what boundaries need to be set. We spoke about physical, emotional, time, and intimate boundaries that we can set. We also spoke about how to allow others to set boundaries with us and to work to equip them with how to support in times of need and/or crisis. We identified what changes we can make today. Attendance: Attended    Patient's Goal:      Verbalizes anger, guilt, and other feelings in a constructive manor           Interventions/techniques: Informed, Validated, Reinforced and Supported    Follows Directions: Followed directions    Interactions: Interacted appropriately    Mental Status: Anxious, Congruent, Preoccupied and Worried    Behavior/appearance: Attentive, Cooperative and Motivated    Goals Achieved: Able to engage in interactions, Able to listen to others, Able to self-disclose, Discussed coping, Discussed discharge plans, Identified feelings, Identified triggers, Identified medication adherence strategies and Identified resources and support systems      Additional Notes:  Pt listened to the group discussion and engaged with the other members. Pt stated that she had been hospitalized about 20 years ago and has been on medication since/. She stated that she got a virus about a month ago and she feels it triggered her anxiety.  She had concerns as she stated she has always been a smoker and they gave her a patch in the ER but she had to take it off last night. She was still waiting to get another one and it was escalating  her level of anxiety to be without. She was able to express her concerns with the staff.      Paulette Emmanuel

## 2022-06-09 NOTE — ROUTINE PROCESS
Shift change report given to Liliana Peralta. And Case Santiago., RN re:  SBAR, medication, and behavior.     Linda Fall: 1

## 2022-06-09 NOTE — MASTER TREATMENT PLAN
[facility] 60  Master Treatment Plan for Adilson Jo     Date Treatment Plan Initiated:     Treatment Plan Modalities:  Type of Modality Amount  (x minutes) Frequency (x/week) Duration (x days) Name of Responsible Staff   [address] [place] [address] meetings to encourage peer interactions 30 14 1 Mark Echevarria., [facility]   Group psychotherapy to assist in building coping skills and internal controls 60 5 1 Mitchel Keita., [facility]  Jonathan Ramírez., [facility]   Therapeutic activity groups to build coping skills 61 7 1 Ene Whitlock., [facility]      Psychoeducation in group setting to address:   Medication education  Coping Skills  Symptom Management    60 7 1 [facility]., [facility]  [facility] Ram[facility]., [facility]  Yohannes Herrera., PRISCILA De Jesus RN   Discharge planning    60 2 1 Saranya Carmona.,    Spirituality     60 2 1 Alyssa Cannon.   Physician medication management    15 7 1 SHWETA Gaston MD                                                                  Treatment Team Signatures     I have participated in the development of this plan of treatment and agree to its implementation.      Patient Signature          Patient Printed Name Date/Time   Social Work/Therapist Signature          Social Work/Therapist Printed Name Date/Time   RN Signature          RN Printed Name  Marco Woods RN Date/Time  6/8/22 @ Damaris Gutierrez       Other Signature Date/Time   MD Signature          MD Printed Name Date/Time

## 2022-06-09 NOTE — H&P
INITIAL PSYCHIATRIC EVALUATION            IDENTIFICATION:    Patient Name  Marie Quintana   Date of Birth 1960   [facility] [id number]   Medical Record Number  [id number]      Age  58 y.o. PCP Veronica Castaneda MD   Admit date:  6/8/2022    Room Number  236/02  @ [facility]   Date of Service  6/9/2022            HISTORY         REASON FOR HOSPITALIZATION:  CC: \"suicidal ideation\". Pt admitted under a voluntary basis for suicidal ideations proving to be an imminent danger to self and an inability to care for self. HISTORY OF PRESENT ILLNESS:    The patient, Marie Quintana, is a 58 y.o. WHITE/NON- female with a past psychiatric history significant for MDD and [place], who presents at this time with complaints of (and/or evidence of) the following emotional symptoms: depression, suicidal thoughts/threats and anxiety. Additional symptomatology include panic attacks. The above symptoms have been present for 2+ weeks. These symptoms are of moderate to high severity. These symptoms are constant in nature. The patient's condition has been precipitated by psychosocial stressors. UDS: negative; BAL=0. The patient is a fair historian. The patient corroborates the above narrative. The patient contracts for safety on the unit and gives consent for the team to contact collateral. The patient is amenable to initiating treatment while on the unit. The patient denies active thoughts of self harm and has been able to make her needs known. She states she had been taking her antidepressant and a benzodiazepine daily for many years, and things changed when she stopped briefly after increasing the dose on her PCP's orders and subsequently running out of pills. ALLERGIES:   Allergies   Allergen Reactions    Sulfa (Sulfonamide Antibiotics) Nausea Only      MEDICATIONS PRIOR TO ADMISSION:   Medications Prior to Admission   Medication Sig    citalopram (CELEXA) 40 mg tablet Take 40 mg by mouth daily.  pantoprazole (PROTONIX) 40 mg tablet Take 40 mg by mouth daily.  clonazePAM (KlonoPIN) 0.5 mg tablet Take 0.5 mg by mouth as directed. PAST MEDICAL HISTORY:   No past medical history on file. No past surgical history on file. SOCIAL HISTORY:  The patient is currently retired; the patient is a smoker, and smokes up to 1.5 ppd; the patient's marital status is ; the patient has adult children; the patient reports the highest level of education achieved is high school. FAMILY HISTORY: History reviewed, pertinent family history as below:   No family history on file. REVIEW OF SYSTEMS:   Pertinent items are noted in the History of Present Illness. All other Systems reviewed and are considered negative. MENTAL STATUS EXAM & VITALS     MENTAL STATUS EXAM (MSE):    MSE FINDINGS ARE WITHIN NORMAL LIMITS (WNL) UNLESS OTHERWISE STATED BELOW. ( ALL OF THE BELOW CATEGORIES OF THE MSE HAVE BEEN REVIEWED (reviewed 6/9/2022) AND UPDATED AS DEEMED APPROPRIATE )  General Presentation age appropriate, cooperative   Orientation oriented to time, place and person   Vital Signs  See below (reviewed 6/9/2022); Vital Signs (BP, Pulse, & Temp) are within normal limits if not listed below.    Gait and Station Stable/steady, no ataxia   Musculoskeletal System No extrapyramidal symptoms (EPS); no abnormal muscular movements or Tardive Dyskinesia (TD); muscle strength and tone are within normal limits   Language No aphasia or dysarthria   Speech:  normal volume and non-pressured   Thought Processes logical; normal rate of thoughts; fair abstract reasoning/computation   Thought Associations goal directed   Thought Content free of hallucinations   Suicidal Ideations contracts for safety   Homicidal Ideations none   Mood:  anxious  and depressed   Affect:  constricted and mood-congruent   Memory recent  intact   Memory remote:  intact   Concentration/Attention:  intact   Fund of Knowledge average   Insight: limited   Reliability fair   Judgment:  fair          VITALS:     Patient Vitals for the past 24 hrs:   Temp Pulse Resp BP SpO2   06/09/22 0806 98 °F (36.7 °C) 86 16 105/64 97 %   06/08/22 1955 96.8 °F (36 °C) 88 18 137/68 95 %     Wt Readings from Last 3 Encounters:   06/08/22 59.9 kg (132 lb)     Temp Readings from Last 3 Encounters:   06/09/22 98 °F (36.7 °C)     BP Readings from Last 3 Encounters:   06/09/22 105/64     Pulse Readings from Last 3 Encounters:   06/09/22 86            DATA     LABORATORY DATA:  Labs Reviewed   CBC WITH AUTOMATED DIFF - Abnormal; Notable for the following components:       Result Value    RDW 14.7 (*)     All other components within normal limits   METABOLIC PANEL, COMPREHENSIVE - Abnormal; Notable for the following components:    BUN/Creatinine ratio 7 (*)     ALT (SGPT) 171 (*)     AST (SGOT) 64 (*)     Alk.  phosphatase 151 (*)     A-G Ratio 0.9 (*)     All other components within normal limits   ACETAMINOPHEN - Abnormal; Notable for the following components:    Acetaminophen level <2 (*)     All other components within normal limits   COVID-19 WITH INFLUENZA A/B   ETHYL ALCOHOL   SALICYLATE   URINALYSIS W/ RFLX MICROSCOPIC   DRUG SCREEN, URINE     Admission on 06/08/2022   Component Date Value Ref Range Status    WBC 06/08/2022 7.7  3.6 - 11.0 K/uL Final    RBC 06/08/2022 4.62  3.80 - 5.20 M/uL Final    HGB 06/08/2022 13.8  11.5 - 16.0 g/dL Final    HCT 06/08/2022 41.0  35.0 - 47.0 % Final    MCV 06/08/2022 88.7  80.0 - 99.0 FL Final    MCH 06/08/2022 29.9  26.0 - 34.0 PG Final    MCHC 06/08/2022 33.7  30.0 - 36.5 g/dL Final    RDW 06/08/2022 14.7* 11.5 - 14.5 % Final    PLATELET 47/58/2050 155  150 - 400 K/uL Final    MPV 06/08/2022 8.9  8.9 - 12.9 FL Final    NRBC 06/08/2022 0.0  0  WBC Final    ABSOLUTE NRBC 06/08/2022 0.00  0.00 - 0.01 K/uL Final    NEUTROPHILS 06/08/2022 50  32 - 75 % Final    LYMPHOCYTES 06/08/2022 45  12 - 49 % Final    MONOCYTES 06/08/2022 5  5 - 13 % Final    EOSINOPHILS 06/08/2022 0  0 - 7 % Final    BASOPHILS 06/08/2022 0  0 - 1 % Final    IMMATURE GRANULOCYTES 06/08/2022 0  0.0 - 0.5 % Final    ABS. NEUTROPHILS 06/08/2022 3.8  1.8 - 8.0 K/UL Final    ABS. LYMPHOCYTES 06/08/2022 3.5  0.8 - 3.5 K/UL Final    ABS. MONOCYTES 06/08/2022 0.4  0.0 - 1.0 K/UL Final    ABS. EOSINOPHILS 06/08/2022 0.0  0.0 - 0.4 K/UL Final    ABS. BASOPHILS 06/08/2022 0.0  0.0 - 0.1 K/UL Final    ABS. IMM. GRANS. 06/08/2022 0.0  0.00 - 0.04 K/UL Final    DF 06/08/2022 MANUAL    Final    RBC COMMENTS 06/08/2022 NORMOCYTIC, NORMOCHROMIC    Final    Sodium 06/08/2022 140  136 - 145 mmol/L Final    Potassium 06/08/2022 3.9  3.5 - 5.1 mmol/L Final    Chloride 06/08/2022 105  97 - 108 mmol/L Final    CO2 06/08/2022 26  21 - 32 mmol/L Final    Anion gap 06/08/2022 9  5 - 15 mmol/L Final    Glucose 06/08/2022 89  65 - 100 mg/dL Final    BUN 06/08/2022 6  6 - 20 MG/DL Final    Creatinine 06/08/2022 0.84  0.55 - 1.02 MG/DL Final    BUN/Creatinine ratio 06/08/2022 7* 12 - 20   Final    GFR est AA 06/08/2022 >60  >60 ml/min/1.73m2 Final    GFR est non-AA 06/08/2022 >60  >60 ml/min/1.73m2 Final    Calcium 06/08/2022 9.0  8.5 - 10.1 MG/DL Final    Bilirubin, total 06/08/2022 0.4  0.2 - 1.0 MG/DL Final    ALT (SGPT) 06/08/2022 171* 12 - 78 U/L Final    AST (SGOT) 06/08/2022 64* 15 - 37 U/L Final    Alk.  phosphatase 06/08/2022 151* 45 - 117 U/L Final    Protein, total 06/08/2022 7.4  6.4 - 8.2 g/dL Final    Albumin 06/08/2022 3.6  3.5 - 5.0 g/dL Final    Globulin 06/08/2022 3.8  2.0 - 4.0 g/dL Final    A-G Ratio 06/08/2022 0.9* 1.1 - 2.2   Final    ALCOHOL(ETHYL),SERUM 06/08/2022 <10  <10 MG/DL Corrected    Acetaminophen level 06/08/2022 <2* 10 - 30 ug/mL Final    Salicylate level 72/14/6665 5.3  2.8 - 20.0 MG/DL Final    SARS-CoV-2 by PCR 06/08/2022 Not detected  NOTD   Final    Influenza A by PCR 06/08/2022 Not detected  NOTD   Final    Influenza B by PCR 06/08/2022 Not detected  NOTD   Final    Color 06/08/2022 YELLOW/STRAW    Final    Appearance 06/08/2022 CLEAR  CLEAR   Final    Specific gravity 06/08/2022 <1.005  1.003 - 1.030 Final    pH (UA) 06/08/2022 6.5  5.0 - 8.0   Final    Protein 06/08/2022 Negative  NEG mg/dL Final    Glucose 06/08/2022 Negative  NEG mg/dL Final    Ketone 06/08/2022 Negative  NEG mg/dL Final    Bilirubin 06/08/2022 Negative  NEG   Final    Blood 06/08/2022 Negative  NEG   Final    Urobilinogen 06/08/2022 0.2  0.2 - 1.0 EU/dL Final    Nitrites 06/08/2022 Negative  NEG   Final    Leukocyte Esterase 06/08/2022 Negative  NEG   Final    AMPHETAMINES 06/08/2022 Negative  NEG   Final    BARBITURATES 06/08/2022 Negative  NEG   Final    BENZODIAZEPINES 06/08/2022 Negative  NEG   Final    COCAINE 06/08/2022 Negative  NEG   Final    METHADONE 06/08/2022 Negative  NEG   Final    OPIATES 06/08/2022 Negative  NEG   Final    PCP(PHENCYCLIDINE) 06/08/2022 Negative  NEG   Final    THC (TH-CANNABINOL) 06/08/2022 Negative  NEG   Final    Drug screen comment 06/08/2022 (NOTE)   Final        RADIOLOGY REPORTS:  No results found for this or any previous visit. No results found.            MEDICATIONS       ALL MEDICATIONS  Current Facility-Administered Medications   Medication Dose Route Frequency    nicotine (NICODERM CQ) 21 mg/24 hr patch 1 Patch  1 Patch TransDERmal DAILY    citalopram (CELEXA) tablet 40 mg  40 mg Oral DAILY    clonazePAM (KlonoPIN) tablet 0.5 mg  0.5 mg Oral QHS    pantoprazole (PROTONIX) tablet 40 mg  40 mg Oral ACB    busPIRone (BUSPAR) tablet 5 mg  5 mg Oral TID    gabapentin (NEURONTIN) capsule 300 mg  300 mg Oral Q8H PRN    OLANZapine (ZyPREXA) tablet 5 mg  5 mg Oral Q6H PRN    haloperidol lactate (HALDOL) injection 5 mg  5 mg IntraMUSCular Q6H PRN    benztropine (COGENTIN) tablet 1 mg  1 mg Oral BID PRN    diphenhydrAMINE (BENADRYL) injection 50 mg  50 mg IntraMUSCular BID PRN    hydrOXYzine HCL (ATARAX) tablet 50 mg  50 mg Oral TID PRN    LORazepam (ATIVAN) injection 1 mg  1 mg IntraMUSCular Q4H PRN    traZODone (DESYREL) tablet 50 mg  50 mg Oral QHS PRN    acetaminophen (TYLENOL) tablet 650 mg  650 mg Oral Q4H PRN    magnesium hydroxide (MILK OF MAGNESIA) 400 mg/5 mL oral suspension 30 mL  30 mL Oral DAILY PRN      SCHEDULED MEDICATIONS  Current Facility-Administered Medications   Medication Dose Route Frequency    nicotine (NICODERM CQ) 21 mg/24 hr patch 1 Patch  1 Patch TransDERmal DAILY    citalopram (CELEXA) tablet 40 mg  40 mg Oral DAILY    clonazePAM (KlonoPIN) tablet 0.5 mg  0.5 mg Oral QHS    pantoprazole (PROTONIX) tablet 40 mg  40 mg Oral ACB    busPIRone (BUSPAR) tablet 5 mg  5 mg Oral TID                ASSESSMENT & PLAN        The patient, Rosie Martínez, is a 58 y.o.  female who presents at this time for treatment of the following diagnoses:  Patient Active Hospital Problem List:   Severe episode of recurrent major depressive disorder, without psychotic features ([facility] [address] (6/9/2022)    Assessment: the patient reports worsening of her mood secondary to increasing anxiety and panic symptoms. She would benefit from adjustment of her regimen to include an agent for [place] as well as added anxiolytic medication to address anxiety attacks. Will treat symptomatically    Plan:  - RESTART Celexa 40 mg QDAY for MDD  - START Buspar 5 mg TID for [place]  - RESTART Klonopin 0.5 mg QHS   - START Neurontin 300 mg Q8H PRN anxiety  - IGM therapy as tolerated  - Expand database / obtain collateral  - Dispo planning (home when stable)           A coordinated, multidisplinary treatment team (includes the nurse, unit pharmacist,  and writer) round was conducted for this initial evaluation with the patient present. The following regarding medications was addressed during rounds with patient: the risks and benefits of the proposed medication.  The patient was given the opportunity to ask questions. Informed consent given to the use of the above medications. I will continue to adjust psychiatric and non-psychiatric medications (see above \"medication\" section and orders section for details) as deemed appropriate & based upon diagnoses and response to treatment. I have reviewed admission (and previous/old) labs and medical tests in the EHR and or transferring hospital documents. I will continue to order blood tests/labs and diagnostic tests as deemed appropriate and review results as they become available (see orders for details). I have reviewed old psychiatric and medical records available in the EHR. I Will order additional psychiatric records from other institutions to further elucidate the nature of patient's psychopathology and review once available. I will gather additional collateral information from friends, family and o/p treatment team to further elucidate the nature of patient's psychopathology and baselline level of psychiatric functioning. I certify that this patient's inpatient psychiatric hospital services are required for treatment that could reasonably be expected to improve the patient's condition, or for diagnostic study, and that the patient continues to need, on a daily basis, active treatment furnished directly by or requiring the supervision of inpatient psychiatric facility personnel. In addition, the hospital records show that services furnished were intensive treatment services, admission or related services, or equivalent services.       ESTIMATED LENGTH OF STAY:  5-7 days       STRENGTHS:  Exercising self-direction/Resourceful, Access to housing/residential stability and Interpersonal/supportive relationships (family, friends, peers)                                        SIGNED:    Abigail Wade MD  6/9/2022

## 2022-06-09 NOTE — PROGRESS NOTES
Pt goal was to make it through the day  No complaints of pain  Anxiety level 10  Depression 2       06/09/22 1114   [address] [facility]   Attendance Attended   Number of participants 7   Time in 1030   Time out 1100   Total Time 30   Interventions/techniques Promoted peer support   Follows directions Followed directions   Interactions Interacted appropriately   Mental Status Calm   Behavior/appearance Attentive; Cooperative   Long Term Risk of Suicide Low   Suicide Protective Factors Denies intent   Risk of Violence Low   Risk of Trauma Low   Goals Achieved Able to engage in interactions; Able to receive feedback; Able to experience relief/decrease in symptoms; Able to listen to others; Able to give feedback to another;Able to self-disclose; Able to reflect/comment on own behavior;Able to manage/cope with feelings

## 2022-06-10 VITALS
OXYGEN SATURATION: 94 % | HEIGHT: 63 IN | BODY MASS INDEX: 23.39 KG/M2 | TEMPERATURE: 96.4 F | DIASTOLIC BLOOD PRESSURE: 66 MMHG | RESPIRATION RATE: 18 BRPM | WEIGHT: 132 LBS | HEART RATE: 84 BPM | SYSTOLIC BLOOD PRESSURE: 131 MMHG

## 2022-06-10 PROBLEM — F32.A DEPRESSION: Status: ACTIVE | Noted: 2022-06-10

## 2022-06-10 PROCEDURE — 99239 HOSP IP/OBS DSCHRG MGMT >30: CPT | Performed by: PSYCHIATRY & NEUROLOGY

## 2022-06-10 PROCEDURE — 74011250637 HC RX REV CODE- 250/637: Performed by: PSYCHIATRY & NEUROLOGY

## 2022-06-10 RX ORDER — BUSPIRONE HYDROCHLORIDE 5 MG/1
5 TABLET ORAL 3 TIMES DAILY
Qty: 90 TABLET | Refills: 1 | Status: SHIPPED | OUTPATIENT
Start: 2022-06-10

## 2022-06-10 RX ORDER — TRAZODONE HYDROCHLORIDE 50 MG/1
50 TABLET ORAL
Qty: 30 TABLET | Refills: 1 | Status: SHIPPED | OUTPATIENT
Start: 2022-06-10

## 2022-06-10 RX ORDER — CITALOPRAM 40 MG/1
40 TABLET, FILM COATED ORAL DAILY
Qty: 30 TABLET | Refills: 1 | Status: SHIPPED | OUTPATIENT
Start: 2022-06-10

## 2022-06-10 RX ORDER — CLONAZEPAM 0.5 MG/1
0.5 TABLET ORAL
Qty: 60 TABLET | Refills: 1 | Status: SHIPPED | OUTPATIENT
Start: 2022-06-10

## 2022-06-10 RX ORDER — PANTOPRAZOLE SODIUM 40 MG/1
40 TABLET, DELAYED RELEASE ORAL DAILY
Qty: 30 TABLET | Refills: 1 | Status: SHIPPED | OUTPATIENT
Start: 2022-06-10

## 2022-06-10 RX ORDER — GABAPENTIN 100 MG/1
CAPSULE ORAL
Qty: 60 CAPSULE | Refills: 1 | Status: SHIPPED | OUTPATIENT
Start: 2022-06-10

## 2022-06-10 RX ADMIN — BUSPIRONE HYDROCHLORIDE 5 MG: 5 TABLET ORAL at 15:02

## 2022-06-10 RX ADMIN — PANTOPRAZOLE SODIUM 40 MG: 40 TABLET, DELAYED RELEASE ORAL at 06:34

## 2022-06-10 RX ADMIN — CITALOPRAM HYDROBROMIDE 40 MG: 20 TABLET ORAL at 08:00

## 2022-06-10 RX ADMIN — GABAPENTIN 300 MG: 300 CAPSULE ORAL at 09:18

## 2022-06-10 RX ADMIN — BUSPIRONE HYDROCHLORIDE 5 MG: 5 TABLET ORAL at 08:00

## 2022-06-10 NOTE — BH NOTES
Patient will be discharged to home with family today. She will follow up with [facility] on July 8 at 36 with Mrs. Didier NP. Patient was agreeable and involved in her discharge plan. [facility] transition of care was routed to her PCP and she was given a copy to take with her.

## 2022-06-10 NOTE — PROGRESS NOTES
06/09/22 4344 [facility] meeting   Attendance Attended   Number of participants 6   Time in 2000   Time out 2100   Total Time 60   MTP problem Anxiety management;Depression; Thought disorder/psychosis   Interventions/techniques Supported   Follows directions Followed directions   Interactions Interacted appropriately   Mental Status Anxious;Calm;Depressed;Preoccupied   Behavior/appearance Cooperative   Suicide Risk Factors Ronald Crowley has no thoughts of hurting herself no anyone else   Suicide Protective Factors Denies intent   Goals Achieved Able to engage in interactions; Able to listen to others; Able to give feedback to another     Ronald Crowley was out for the [employer] tonight. She said that she is having a better day then yesterday, ate okay, and hopes to sleep good tonight. She is having no anxiety but depression is at the level of a 4. Something good that has happened is talking with peers and seeing everyone can have issues and talk about them, but nothing bad has happened. She has no thoughts of hurting herself nor anyone else and is having lower back pain of a 4.     Fiona Crouch CNA

## 2022-06-10 NOTE — CONSULTS
[facility] Admission History & Physical        6/9/2022 9:48 PM  Patient: Leonardo Sena 1960  PCP: Anant Seaman MD    HISTORY  Chief Complaint:   Chief Complaint   Patient presents with    Anxiety       HPI: 58 y.o. female presenting for admission to [facility] for further evaluation and treatment for Severe episode of recurrent major depressive disorder, without psychotic features ([address] She presented yesterday to our local [facility] ED with complaints of severe unrelenting anxiety. She has had difficulty with this in the past and had done very well until the past few weeks. She is uncertain of any precipitating factors. She has had recent health issues being treated for COVID in January and subsequently a fall in Feb with an apparent compression fracture. She has been treated for years with a combination of Celexa and low-dose Klonopin with very acceptable results not interfering with her cognitive abilities. Her symptoms not controlled by these measures at this time. Had recent follow-up with her PCP she had some elevation in her LFTs and was advised to withhold her Lipitor which had been taken for some time. She is a chronic smoker and has been placed on NicoDerm on admission. Chart review notes laboratories documented a normal AST of 20 in December 2020, elevated 68 in January 2022 and further elevated 200 on June 1. Other chemistries included an elevated ALT of 385 and alk phos of 147 on June 1. These were not checked on the previous exams. Serum lipase is normal and total bilirubin is normal.  She was assessed with a CT abdomen and pelvis without contrast on June 1 which reported no acute intra-abdominal abnormality, mild compression fracture deformity involving the superior endplate of L3 approximately 20% loss of height, and nonemergent findings.     Labs today performed at a different lab suggest some improvement with the ALT down to 171 and AST down to 64 with elevated alk phos of 151. Interestingly she notes an improved appetite over the past week. She has had some chronic difficulty with poor appetite and a need to supplement her nutrition with Ensure etc.    Past Medical History:  No past medical history on file. Past Surgical History:  No past surgical history on file. Medication:  Prior to Admission medications    Medication Sig Start Date End Date Taking? Authorizing Provider   citalopram (CELEXA) 40 mg tablet Take 40 mg by mouth daily. 5/26/22  Yes Other, MD Kwadwo   pantoprazole (PROTONIX) 40 mg tablet Take 40 mg by mouth daily. 5/20/22  Yes Other, MD Kwadwo   clonazePAM (KlonoPIN) 0.5 mg tablet Take 0.5 mg by mouth as directed. Other, MD Kwadwo       Allergies: Allergies   Allergen Reactions    Sulfa (Sulfonamide Antibiotics) Nausea Only       Social History:  Social History     Tobacco Use    Smoking status: Not on file    Smokeless tobacco: Not on file   Substance Use Topics    Alcohol use: Not on file    Drug use: Not on file       Family History:  No family history on file.     ROS:  Total of 12 systems reviewed as follows:  POSITIVE= bolded text  Negative = text not bolded       General:  fever, chills, sweats, generalized weakness, weight loss/gain, loss of appetite   Eyes:    blurred vision, eye pain, loss of vision, double vision  ENT:    rhinorrhea, pharyngitis   Respiratory:  cough, sputum production, SOB, WELLER, wheezing, pleuritic pain   Cardiology:   chest pain, palpitations, orthopnea, PND, edema, syncope   Gastrointestinal:  abdominal pain , N/V, diarrhea, dysphagia, constipation, bleeding   Genitourinary:  frequency, urgency, dysuria, hematuria, incontinence, prostatism   Muskuloskeletal: arthralgia, myalgia, back pain  Hematology:   easy bruising, nose or gum bleeding, lymphadenopathy   Dermatological: rash, ulceration, pruritis, color change / jaundice  Endocrine:   hot flashes or polydipsia   Neurological: headache, dizziness, confusion, focal weakness, paresthesia, speech difficulties, memory loss, gait difficulty  Psychological: feelings of anxiety, depression, agitation      PHYSICAL EXAM:  Patient Vitals for the past 24 hrs:   Temp Pulse Resp BP SpO2   06/09/22 1915 (!) 96.6 °F (35.9 °C) 81 17 114/65 94 %   06/09/22 0806 98 °F (36.7 °C) 86 16 105/64 97 %       General:    Alert, cooperative, no distress, appears stated age. HEENT: Atraumatic, anicteric sclerae, pink conjunctivae     No oral ulcers, mucosa moist, throat clear, dentition fair  Neck:  Supple, symmetrical;   thyroid non tender  Lungs:   Clear to auscultation bilaterally. No wheezing or rhonchi. No rales. Chest wall:  No tenderness. No accessory muscle use. Heart:   Regular rhythm. No  murmur. No edema  Abdomen:   Soft, non-tender. Not distended. Bowel sounds normal  Extremities: No cyanosis. No clubbing      Capillary refill normal,  Radial pulse 2+,  DP 2+  Skin:     Not pale. Not jaundiced. No rashes   Psych:  Depressed. Mildly anxious  Neurologic: EOMs intact. No facial asymmetry. No aphasia or slurred speech. Symmetrical strength, Sensation grossly intact. Alert and oriented     Lab Data Reviewed:    Results for Joe Gregoryd (MRN [id number]) as of 6/10/2022 02:59   6/8/2022 11:39   Sodium 140   Potassium 3.9   Chloride 105   CO2 26   Anion gap 9   Glucose 89   BUN 6   Creatinine 0.84   BUN/Creatinine ratio 7 (L)   Calcium 9.0   GFR est non-AA >60   GFR est AA >60   Bilirubin, total 0.4   Protein, total 7.4   Albumin 3.6   Globulin 3.8   A-G Ratio 0.9 (L)    (H)   AST 64 (H)   Alk.  phosphatase 151 (H)     Results for Joe Martin (MRN [id number]) as of 6/10/2022 02:59   6/8/2022 11:39   WBC 7.7   NRBC 0.0   RBC 4.62   HGB 13.8   HCT 41.0   MCV 88.7   MCH 29.9   MCHC 33.7   RDW 14.7 (H)   PLATELET 702   MPV 8.9   NEUTROPHILS 50   LYMPHOCYTES 45   MONOCYTES 5     Results for Algis Martin (MRN [id number]) as of 6/10/2022 02:59   6/8/2022 11:40   Color YELLOW/STRAW   Appearance CLEAR   Specific gravity <1.005   pH (UA) 6.5   Protein Negative   Glucose Negative   Ketone Negative   Blood Negative   Bilirubin Negative   Urobilinogen 0.2   Nitrites Negative   Leukocyte Esterase Negative     Results for Algarnol Martin (MRN [id number]) as of 6/10/2022 02:59   6/8/2022 11:39   Acetaminophen level <2 (L)   Salicylate level 5.3     Results for Algis Martin (MRN [id number]) as of 6/10/2022 02:59   6/8/2022 11:39 6/8/2022 11:40   ALCOHOL(ETHYL),SERUM <10    AMPHETAMINES  Negative   BARBITURATES  Negative   BENZODIAZEPINES  Negative   COCAINE  Negative   METHADONE  Negative   OPIATES  Negative   PCP(PHENCYCLIDINE)  Negative   THC (TH-CANNABINOL)  Negative     Results for Algis Martin (MRN [id number]) as of 6/10/2022 02:59   6/8/2022 11:55   Influenza A by PCR Not detected   Influenza B by PCR Not detected   SARS-COV-2  PCR Not detected     EKG: none    Radiology: none    Care Plan discussed with:   Patient x    Family     RN x         Consultant      Expected  Disposition:   Home with Family x   HH/PT/OT/RN    SNF/LTC    [facility]      TOTAL TIME:  60 Minutes      Comments    x Reviewed previous records   >50% of visit spent in counseling and coordination of care x Discussion with patient and/or family and questions answered       _______________________________________________________        My assessment of this patient's clinical condition and my plan of care is as follows. ASSESSMENT / PLAN    Principal Problem:    Severe episode of recurrent major depressive disorder, without psychotic features ([address] (6/9/2022)  Patient admitted to [facility] inpatient for further evaluation and treatment  Started on BuSpar 5 mg 3 times daily. Maintained her usual Celexa and Klonopin.     Active Problems:    Abnormal LFTs (6/9/2022)    Anorexia (6/9/2022)  Reports improved appetite  Active and recent viral illness  Possible hepatic toxicity of Lipitor  LFTs somewhat improved, should follow weekly      Cigarette nicotine dependence (6/9/2022)  Abstain, NicoDerm        SAFETY:   Code Status:Full  DVT prophylaxis:no anticoagulation on [facility]  Stress Ulcer prophylaxis: Protonix (home med)  Bladder catheter:no  Family Contact Info:  Primary Emergency Contact: Robin Ambrosio Phone: [phone]  Bedded: [facility] Room 236/02  Disposition: TBD, likely home when stable  Admission status:  Inpatient [facility]    -Tentative plan of care discussed with patient / family, who demonstrated understanding and is in agreement to the above      Jaimee Garcia MD  [facility] Hospitalist  [phone]

## 2022-06-10 NOTE — ROUTINE PROCESS
Shift change report given to  Bennett Martinez RN. Re; SBAR, medications, and behavior.    COY fall risk score; 1

## 2022-06-10 NOTE — BH NOTES
PSYCHOSOCIAL ASSESSMENT  :Patient identifying info:   Ahsan Thompson is a 58 y.o., female admitted 6/8/2022 11:33 AM     Presenting problem and precipitating factors: who presents at this time with complaints of the following emotional symptoms: depression, suicidal thoughts/threats and anxiety. Mental status assessment:   Thought Processes logical; normal rate of thoughts; fair abstract reasoning/computation   Thought Associations goal directed   Thought Content free of hallucinations   Suicidal Ideations contracts for safety   Homicidal Ideations none   Mood:  anxious  and depressed   Affect:  constricted and mood-congruent   Memory recent  intact   Memory remote:  intact   Concentration/Attention:  intact   Fund of Knowledge average   Insight:  limited   Reliability fair   Judgment:  fair         Strengths/Recreation/Coping Skills:able to care for self, has support, connected with providers    Collateral information: patient    Current psychiatric /substance abuse providers and contact info: [facility]    Previous psychiatric/substance abuse providers and response to treatment: PCP was prescribing meds    Family history of mental illness or substance abuse: denies    Substance abuse history:  denies  Social History     Tobacco Use    Smoking status: Not on file    Smokeless tobacco: Not on file   Substance Use Topics    Alcohol use: Not on file       History of biomedical complications associated with substance abuse: n/a    Patient's current acceptance of treatment or motivation for change: n/a    Family constellation: , son    Is significant other involved? yes    Describe support system: family, friends    Describe living arrangements and home environment: lives with     GUARDIAN/POA: NO    Guardian Name:     Guardian Contact:     Health issues:   Hospital Problems  Date Reviewed: 6/9/2022          Codes Class Noted POA    Depression ICD-10-CM: F32. A  ICD-9-CM: 874  6/10/2022 Unknown        * (Principal) Severe episode of recurrent major depressive disorder, without psychotic features ([facility] [address] ICD-10-CM: F33.2  ICD-9-CM: 296.33  2022 Yes        Abnormal LFTs ICD-10-CM: R79.89  ICD-9-CM: 790.6  2022 Yes        Anorexia ICD-10-CM: R63.0  ICD-9-CM: 783.0  2022 Yes        Cigarette nicotine dependence (Chronic) ICD-10-CM: I90.327  ICD-9-CM: 305.1  2022 Yes              Trauma history: denies    Legal issues: denies    History of  service: denies    Financial status: care home, SS    Alevism/cultural factors: none expressed    Education/work history: HS retired from [employer] 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

## 2022-06-10 NOTE — DISCHARGE INSTRUCTIONS
Patient Education        Anxiety Disorder: Care Instructions  Your Care Instructions     Anxiety is a normal reaction to stress. Difficult situations can cause you to have symptoms such as sweaty palms and a nervous feeling. In an anxiety disorder, the symptoms are far more severe. Constant worry, muscle tension, trouble sleeping, nausea and diarrhea, and other symptoms can make normal daily activities difficult or impossible. These symptoms may occur for no reason, and they can affect your work, school, or social life. Medicines, counseling, and self-care can all help. Follow-up care is a key part of your treatment and safety. Be sure to make and go to all appointments, and call your doctor if you are having problems. It's also a good idea to know your test results and keep a list of the medicines you take. How can you care for yourself at home? · Take medicines exactly as directed. Call your doctor if you think you are having a problem with your medicine. · Go to your counseling sessions and follow-up appointments. · Recognize and accept your anxiety. Then, when you are in a situation that makes you anxious, say to yourself, \"This is not an emergency. I feel uncomfortable, but I am not in danger. I can keep going even if I feel anxious. \"  · Be kind to your body:  ? Relieve tension with exercise or a massage. ? Get enough rest.  ? Avoid alcohol, caffeine, nicotine, and illegal drugs. They can increase your anxiety level and cause sleep problems. ? Learn and do relaxation techniques. See below for more about these techniques. · Engage your mind. Get out and do something you enjoy. Go to a funny movie, or take a walk or hike. Plan your day. Having too much or too little to do can make you anxious. · Keep a record of your symptoms. Discuss your fears with a good friend or family member, or join a support group for people with similar problems. Talking to others sometimes relieves stress.   · Get involved in social groups, or volunteer to help others. Being alone sometimes makes things seem worse than they are. · Get at least 30 minutes of exercise on most days of the week to relieve stress. Walking is a good choice. You also may want to do other activities, such as running, swimming, cycling, or playing tennis or team sports. Relaxation techniques  Do relaxation exercises 10 to 20 minutes a day. You can play soothing, relaxing music while you do them, if you wish. · Tell others in your house that you are going to do your relaxation exercises. Ask them not to disturb you. · Find a comfortable place, away from all distractions and noise. · Lie down on your back, or sit with your back straight. · Focus on your breathing. Make it slow and steady. · Breathe in through your nose. Breathe out through either your nose or mouth. · Breathe deeply, filling up the area between your navel and your rib cage. Breathe so that your belly goes up and down. · Do not hold your breath. · Breathe like this for 5 to 10 minutes. Notice the feeling of calmness throughout your whole body. As you continue to breathe slowly and deeply, relax by doing the following for another 5 to 10 minutes:  · Tighten and relax each muscle group in your body. You can begin at your toes and work your way up to your head. · Imagine your muscle groups relaxing and becoming heavy. · Empty your mind of all thoughts. · Let yourself relax more and more deeply. · Become aware of the state of calmness that surrounds you. · When your relaxation time is over, you can bring yourself back to alertness by moving your fingers and toes and then your hands and feet and then stretching and moving your entire body. Sometimes people fall asleep during relaxation, but they usually wake up shortly afterward. · Always give yourself time to return to full alertness before you drive a car or do anything that might cause an accident if you are not fully alert.  Never play a relaxation tape while you drive a car. When should you call for help? Call 911 anytime you think you may need emergency care. For example, call if:    · You feel you cannot stop from hurting yourself or someone else. Keep the numbers for these national suicide hotlines: 9-999-625-TALK (1-537.478.8968) and 8-539-BASHLAZ (5-974.428.4492). If you or someone you know talks about suicide or feeling hopeless, get help right away. Watch closely for changes in your health, and be sure to contact your doctor if:    · You have anxiety or fear that affects your life.     · You have symptoms of anxiety that are new or different from those you had before. Where can you learn more? Go to http://www.singh.com/  Enter P754 in the search box to learn more about \"Anxiety Disorder: Care Instructions. \"  Current as of: June 16, 2021               Content Version: 13.2  © 2006-2022 [employer]. Care instructions adapted under license by [employer] (which disclaims liability or warranty for this information). If you have questions about a medical condition or this instruction, always ask your healthcare professional. [place] [address] any warranty or liability for your use of this information. BEHAVIORAL HEALTH NURSING DISCHARGE NOTE      The following personal items collected during your admission are returned to you:   Dental Appliance: Dental Appliances: None  Vision: Visual Aid: Glasses,With patient  Hearing Aid:    Jewelry: Jewelry: Ring  Clothing: Clothing: Footwear,At bedside,Pants,Shirt,Undergarments,Sweater,With patient  Other Valuables:  Other Valuables: Cell Phone,Cigarettes,Lighter/matches  Valuables sent to safe: Personal Items Sent to Safe:  (Cell phone,  and cigarettes)      PATIENT INSTRUCTIONS:    What to do at Home:          Recommended diet:Regular          Recommended activity: as tolerated    Follow-up with [facility] on Jul 8,2022 at 1130 with Mitch Pinto . If you have problems relating to your recovery, call your physician. The discharge information has been reviewed with the patient. The patient verbalized understanding.

## 2022-06-10 NOTE — BH NOTES
Behavioral Health Transition Record to Provider    Patient Name: Adilson Jo  YOB: 1960  Medical Record Number: [id number]  Date of Admission: 6/8/2022  Date of Discharge: 7/10/22    Attending Provider: Ehsan Rawls, *  Discharging Provider: Sal George  To contact this individual call [phone] and ask the  to page. If unavailable, ask to be transferred to [address] [place] [address] Provider on call. [facility] Provider will be available on call 24/7 and during holidays. Primary Care Provider: Marii Eason MD    Allergies   Allergen Reactions    Sulfa (Sulfonamide Antibiotics) Nausea Only       Reason for Admission: Anxiety    Admission Diagnosis: Depressed [F32. A]  Depression [F32. A]    * No surgery found *    Results for orders placed or performed during the hospital encounter of 06/08/22   COVID-19 WITH INFLUENZA A/B   Result Value Ref Range    SARS-CoV-2 by PCR Not detected NOTD      Influenza A by PCR Not detected NOTD      Influenza B by PCR Not detected NOTD     CBC WITH AUTOMATED DIFF   Result Value Ref Range    WBC 7.7 3.6 - 11.0 K/uL    RBC 4.62 3.80 - 5.20 M/uL    HGB 13.8 11.5 - 16.0 g/dL    HCT 41.0 35.0 - 47.0 %    MCV 88.7 80.0 - 99.0 FL    MCH 29.9 26.0 - 34.0 PG    MCHC 33.7 30.0 - 36.5 g/dL    RDW 14.7 (H) 11.5 - 14.5 %    PLATELET 358 556 - 516 K/uL    MPV 8.9 8.9 - 12.9 FL    NRBC 0.0 0  WBC    ABSOLUTE NRBC 0.00 0.00 - 0.01 K/uL    NEUTROPHILS 50 32 - 75 %    LYMPHOCYTES 45 12 - 49 %    MONOCYTES 5 5 - 13 %    EOSINOPHILS 0 0 - 7 %    BASOPHILS 0 0 - 1 %    IMMATURE GRANULOCYTES 0 0.0 - 0.5 %    ABS. NEUTROPHILS 3.8 1.8 - 8.0 K/UL    ABS. LYMPHOCYTES 3.5 0.8 - 3.5 K/UL    ABS. MONOCYTES 0.4 0.0 - 1.0 K/UL    ABS. EOSINOPHILS 0.0 0.0 - 0.4 K/UL    ABS. BASOPHILS 0.0 0.0 - 0.1 K/UL    ABS. IMM.  GRANS. 0.0 0.00 - 0.04 K/UL    DF MANUAL      RBC COMMENTS NORMOCYTIC, NORMOCHROMIC     METABOLIC PANEL, COMPREHENSIVE   Result Value Ref Range Sodium 140 136 - 145 mmol/L    Potassium 3.9 3.5 - 5.1 mmol/L    Chloride 105 97 - 108 mmol/L    CO2 26 21 - 32 mmol/L    Anion gap 9 5 - 15 mmol/L    Glucose 89 65 - 100 mg/dL    BUN 6 6 - 20 MG/DL    Creatinine 0.84 0.55 - 1.02 MG/DL    BUN/Creatinine ratio 7 (L) 12 - 20      GFR est AA >60 >60 ml/min/1.73m2    GFR est non-AA >60 >60 ml/min/1.73m2    Calcium 9.0 8.5 - 10.1 MG/DL    Bilirubin, total 0.4 0.2 - 1.0 MG/DL    ALT (SGPT) 171 (H) 12 - 78 U/L    AST (SGOT) 64 (H) 15 - 37 U/L    Alk. phosphatase 151 (H) 45 - 117 U/L    Protein, total 7.4 6.4 - 8.2 g/dL    Albumin 3.6 3.5 - 5.0 g/dL    Globulin 3.8 2.0 - 4.0 g/dL    A-G Ratio 0.9 (L) 1.1 - 2.2     ETHYL ALCOHOL   Result Value Ref Range    ALCOHOL(ETHYL),SERUM <10 <10 MG/DL   ACETAMINOPHEN   Result Value Ref Range    Acetaminophen level <2 (L) 10 - 30 ug/mL   SALICYLATE   Result Value Ref Range    Salicylate level 5.3 2.8 - 20.0 MG/DL   URINALYSIS W/ RFLX MICROSCOPIC   Result Value Ref Range    Color YELLOW/STRAW      Appearance CLEAR CLEAR      Specific gravity <1.005 1.003 - 1.030    pH (UA) 6.5 5.0 - 8.0      Protein Negative NEG mg/dL    Glucose Negative NEG mg/dL    Ketone Negative NEG mg/dL    Bilirubin Negative NEG      Blood Negative NEG      Urobilinogen 0.2 0.2 - 1.0 EU/dL    Nitrites Negative NEG      Leukocyte Esterase Negative NEG     DRUG SCREEN, URINE   Result Value Ref Range    AMPHETAMINES Negative NEG      BARBITURATES Negative NEG      BENZODIAZEPINES Negative NEG      COCAINE Negative NEG      METHADONE Negative NEG      OPIATES Negative NEG      PCP(PHENCYCLIDINE) Negative NEG      THC (TH-CANNABINOL) Negative NEG      Drug screen comment (NOTE)        Immunizations administered during this encounter: There is no immunization history on file for this patient.     Screening for Metabolic Disorders for Patients on Antipsychotic Medications  (Data obtained from the EMR)    Estimated Body Mass Index  Estimated body mass index is 23.38 kg/m² as calculated from the following:    Height as of this encounter: 5' 3\" (1.6 m). Weight as of this encounter: 59.9 kg (132 lb). Vital Signs/Blood Pressure  Visit Vitals  /66 (BP 1 Location: Left arm, BP Patient Position: Sitting)   Pulse 84   Temp (!) 96.4 °F (35.8 °C)   Resp 18   Ht 5' 3\" (1.6 m)   Wt 59.9 kg (132 lb)   SpO2 94%   BMI 23.38 kg/m²       Blood Glucose/Hemoglobin A1c  Lab Results   Component Value Date/Time    Glucose 89 06/08/2022 11:39 AM       No results found for: HBA1C, [id number]     Lipid Panel  No results found for: CHOL, CHOLX, CHLST, CHOLV, 242500, HDL, HDLP, LDL, LDLC, DLDLP, TGLX, TRIGL, TRIGP, CHHD, CHHDX     Discharge Diagnosis: Severe episode of recurrent major depressive disorder, without psychotic features     Discharge Plan: Discharge plan of care/case management plan validated with provider discharge order. Discharge Medication List and Instructions:   Current Discharge Medication List      START taking these medications    Details   busPIRone (BUSPAR) 5 mg tablet Take 1 Tablet by mouth three (3) times daily. Qty: 90 Tablet, Refills: 1  Start date: 6/10/2022      gabapentin (Neurontin) 100 mg capsule Take 1 to 2 capsules by mouth twice daily as needed for panic / anxiety. Indications: Anxiety  Qty: 60 Capsule, Refills: 1  Start date: 6/10/2022    Associated Diagnoses: Generalized anxiety disorder      traZODone (DESYREL) 50 mg tablet Take 1 Tablet by mouth nightly as needed for Sleep (For insomnia). Qty: 30 Tablet, Refills: 1  Start date: 6/10/2022         CONTINUE these medications which have CHANGED    Details   clonazePAM (KlonoPIN) 0.5 mg tablet Take 1 Tablet by mouth two (2) times daily as needed (Refractory anxiety). Max Daily Amount: 1 mg. Take at least 30 minutes after gabapentin  Qty: 60 Tablet, Refills: 1  Start date: 6/10/2022    Associated Diagnoses: Generalized anxiety disorder      citalopram (CELEXA) 40 mg tablet Take 1 Tablet by mouth daily.   Qty: 30 Tablet, Refills: 1  Start date: 6/10/2022      pantoprazole (PROTONIX) 40 mg tablet Take 1 Tablet by mouth daily. Indications: gastroesophageal reflux disease  Qty: 30 Tablet, Refills: 1  Start date: 6/10/2022             Unresulted Labs (24h ago, onward)            None        To obtain results of studies pending at discharge, please contact     Follow-up Information     Follow up With Specialties Details Why Contact Info    Ross Welsh MD Family Medicine Go to As needed [address] [facility] [address]  [address] [facility] [address] [id number]  [address] [place] [address] on 7/8/2022  11:30am For medication management Guille Armentakatherinekemar [address]  [address] [facility] [address] [facility], [address] [facility]  [phone]          Advanced Directive:   Does the patient have an appointed surrogate decision maker? No  Does the patient have a Medical Advance Directive? No  Does the patient have a Psychiatric Advance Directive? No  If the patient does not have a surrogate or Medical Advance Directive AND Psychiatric Advance Directive, the patient was offered information on these advance directives Yes and Patient will complete at a later time    Patient Instructions: Please continue all medications until otherwise directed by physician. Tobacco Cessation Discharge Plan:   Is the patient a smoker and needs referral for smoking cessation? Yes  Patient referred to the following for smoking cessation with an appointment? yes     Patient was offered medication to assist with smoking cessation at discharge? Refused  Was education for smoking cessation added to the discharge instructions? Yes    Alcohol/Substance Abuse Discharge Plan:   Does the patient have a history of substance/alcohol abuse and requires a referral for treatment? No  Patient referred to the following for substance/alcohol abuse treatment with an appointment? Not applicable  Patient was offered medication to assist with alcohol cessation at discharge?  Not applicable  Was education for substance/alcohol abuse added to discharge instructions? Not applicable    Patient discharged to Home; discussed with patient/caregiver and provided to the patient/caregiver either in hard copy or electronically.

## 2022-06-10 NOTE — BH NOTES
Affect blunted mood anxious/depressed. Ate 90% of meals. All belongings returned to patient. Verbalized understanding of DC instructions. Denies SI/HI/AVH/pain. Med compliant. DC with  at 12. Attended group activity as active participant just prior to dc.

## 2022-06-10 NOTE — PROGRESS NOTES
Problem: Anxiety  Goal: *Alleviation of anxiety  Outcome: Resolved/Met  Goal: *Alleviation of anxiety (Palliative Care)  Outcome: Resolved/Met     Problem: Falls - Risk of  Goal: *Absence of Falls  Description: Document Linda Fall Risk and appropriate interventions in the flowsheet. 6/10/2022 1527 by Luma Díaz RN  Outcome: Resolved/Met  6/10/2022 1526 by Luma Díaz RN  Outcome: Progressing Towards Goal  Note: Fall Risk Interventions:            Medication Interventions: Teach patient to arise slowly       Steady gait. A and o x 3. No dizziness. Ate well. Hydrates well.             Problem: Patient Education: Go to Patient Education Activity  Goal: Patient/Family Education  Outcome: Resolved/Met     Problem: Depressed Mood (Adult/Pediatric)  Goal: *STG: Participates in treatment plan  Outcome: Resolved/Met  Goal: *STG: Participates in 1:1 therapy sessions  Outcome: Resolved/Met  Goal: *STG: Verbalizes anger, guilt, and other feelings in a constructive manor  Outcome: Resolved/Met  Goal: *STG: Attends activities and groups  Outcome: Resolved/Met  Goal: *STG: Demonstrates reduction in symptoms and increase in insight into coping skills/future focused  Outcome: Resolved/Met  Goal: *STG: Remains safe in hospital  Outcome: Resolved/Met  Goal: *STG: Complies with medication therapy  Outcome: Resolved/Met  Goal: *LTG: Returns to previous level of functioning and participates with after care plan  Outcome: Resolved/Met  Goal: *LTG: Understands illness and can identify signs of relapse  Outcome: Resolved/Met  Goal: Interventions  Outcome: Resolved/Met     Problem: Patient Education: Go to Patient Education Activity  Goal: Patient/Family Education  Outcome: Resolved/Met     Problem: Patient Education: Go to Patient Education Activity  Goal: Patient/Family Education  Outcome: Resolved/Met     Problem: Sleep Disturbance  Goal: *LTG: Restoration of normal sleep pattern  Outcome: Resolved/Met  Goal: *LTG: Feel refreshed and energetic during wakeful hours  Outcome: Resolved/Met  Goal: *LTG: Termination of anxiety-producing dreams that cause awakening  Outcome: Resolved/Met  Goal: *LTG: End abrupt awakening in terror and return to peaceful, restful sleep pattern  Outcome: Resolved/Met  Goal: *LTG: Restore restful sleep with reduction of sleepwalking incidents  Outcome: Resolved/Met  Goal: *STG: Keep a journal of daily stressors & sleep pattern  Outcome: Resolved/Met  Goal: *STG: Share history of substance abuse or medication use  Outcome: Resolved/Met  Goal: *STG: Verbalize depressive feelings and share possible causes  Outcome: Resolved/Met  Goal: *STG: Discuss experiences of emotional traumas that continue to disturb sleep  Outcome: Resolved/Met  Goal: *STG: Follow sleep induction schedule of events  Outcome: Resolved/Met  Goal: *STG: Practice deep-muscle relaxation exercises  Outcome: Resolved/Met  Goal: *STG: Describe disturbing dreams by keeping a dream journal  Outcome: Resolved/Met  Goal: *STG: Take medications as prescribed to assess effect on sleep  Outcome: Resolved/Met  Goal: *STG: Discuss fears regarding relinquishing control  Outcome: Resolved/Met  Goal: *STG: Share childhood traumatic experiences associated with sleep experience  Outcome: Resolved/Met  Goal: *Patient Specific Goal (EDIT GOAL, INSERT TEXT)  Outcome: Resolved/Met  Goal: *Patient Specific Goal (EDIT GOAL, INSERT TEXT)  Outcome: Resolved/Met  Goal: Sleep Disturbance Interventions  Outcome: Resolved/Met     Problem: Tobacco Use  Goal: *Tobacco use abstinence  Outcome: Resolved/Met  Goal: *Knowledge of tobacco use cessation methods  Outcome: Resolved/Met     Problem: Patient Education: Go to Patient Education Activity  Goal: Patient/Family Education  Outcome: Resolved/Met

## 2022-06-10 NOTE — PROGRESS NOTES
Problem: Anxiety  Goal: *Alleviation of anxiety  Outcome: Progressing Towards Goal     Problem: Falls - Risk of  Goal: *Absence of Falls  Description: Document Mikki Ugojaneth Fall Risk and appropriate interventions in the flowsheet.   Outcome: Progressing Towards Goal  Note: Fall Risk Interventions:            Medication Interventions: Teach patient to arise slowly                   Problem: Depressed Mood (Adult/Pediatric)  Goal: *STG: Verbalizes anger, guilt, and other feelings in a constructive manor  Outcome: Progressing Towards Goal

## 2022-06-10 NOTE — BH NOTES
Patient cooperative with care. Mood anxious, smiling affect. Alert and oriented x 4. Interacts with staff and peers. Vocalized that she feels better since being here. Attended and participated in group. Denies SI/HI/AVH and pain. Medication compliant. Ate 100%  snack. Patient stable with no s/s of distress. Rested in bed with eyes closed for 7 hours.        PRN Medication Documentation    Specific patient behavior that led to the need for PRN medication: restless  Staff interventions attempted prior to PRN being given: lights dim  PRN medication given: Trazodone 50 mg PO @ 2118  Patient responsiveness/effectiveness of PRN medication: effective

## 2022-06-10 NOTE — PROGRESS NOTES
Patient did not attend nor participate in community meeting with her peers this morning.  Patient was resting at time of meeting.   06/10/22 [address] meeting   Attendance Did not attend   Number of participants 4   Time in 1030   Time out 1100   Total Time 30   MTP problem Depression

## 2022-06-10 NOTE — PROGRESS NOTES
Problem: Falls - Risk of  Goal: *Absence of Falls  Description: Document Cindia Neigh Fall Risk and appropriate interventions in the flowsheet. Outcome: Progressing Towards Goal  Note: Fall Risk Interventions:            Medication Interventions: Teach patient to arise slowly       Steady gait. A and o x 3. No dizziness. Ate well. Hydrates well. Wearing gripper socks.

## 2022-06-11 NOTE — DISCHARGE SUMMARY
PSYCHIATRIC DISCHARGE SUMMARY         IDENTIFICATION:    Patient Name  Petey Polo   Date of Birth 1960   [facility] [id number]   Medical Record Number  [id number]      Age  58 y.o. PCP Sheela Salinas MD   Admit date:  6/8/2022    Discharge date: 6/10/2022   Room Number  236/02  @ [facility]   Date of Service  6/10/2022            TYPE OF DISCHARGE: REGULAR               CONDITION AT DISCHARGE: fair       PROVISIONAL & DISCHARGE DIAGNOSES:    Problem List  Date Reviewed: 6/9/2022          Codes Class    Depression ICD-10-CM: F32. A  ICD-9-CM: 311         * (Principal) Severe episode of recurrent major depressive disorder, without psychotic features ([facility] [address] ICD-10-CM: F33.2  ICD-9-CM: 296.33         Abnormal LFTs ICD-10-CM: R79.89  ICD-9-CM: 790.6         Anorexia ICD-10-CM: R63.0  ICD-9-CM: 783.0         Cigarette nicotine dependence (Chronic) ICD-10-CM: T95.859  ICD-9-CM: 305.1               Active Hospital Problems    Depression      *Severe episode of recurrent major depressive disorder, without psychotic features ([facility] [address]      Abnormal LFTs      Anorexia      Cigarette nicotine dependence        DISCHARGE DIAGNOSIS:   Axis I:  SEE ABOVE  Axis II: SEE ABOVE  Axis III: SEE ABOVE  Axis IV:  lack of structure  Axis V:  40 on admission, 60 on discharge     CC & HISTORY OF PRESENT ILLNESS:  \"suicidal ideation\"    The patient, Petey Polo, is a 58 y.o. WHITE/NON- female with a past psychiatric history significant for MDD and [place], who presents at this time with complaints of (and/or evidence of) the following emotional symptoms: depression, suicidal thoughts/threats and anxiety. Additional symptomatology include panic attacks. The above symptoms have been present for 2+ weeks. These symptoms are of moderate to high severity. These symptoms are constant in nature. The patient's condition has been precipitated by psychosocial stressors.  UDS: negative; BAL=0.     The patient is a fair historian. The patient corroborates the above narrative. The patient contracts for safety on the unit and gives consent for the team to contact collateral. The patient is amenable to initiating treatment while on the unit. The patient denies active thoughts of self harm and has been able to make her needs known. She states she had been taking her antidepressant and a benzodiazepine daily for many years, and things changed when she stopped briefly after increasing the dose on her PCP's orders and subsequently running out of pills. SOCIAL HISTORY:    Social History     Socioeconomic History    Marital status:      Spouse name: Not on file    Number of children: Not on file    Years of education: Not on file    Highest education level: Not on file   Occupational History    Not on file   Tobacco Use    Smoking status: Not on file    Smokeless tobacco: Not on file   Substance and Sexual Activity    Alcohol use: Not on file    Drug use: Not on file    Sexual activity: Not on file   Other Topics Concern    Not on file   Social History Narrative    Not on file     Social Determinants of Health     Financial Resource Strain:     Difficulty of Paying Living Expenses: Not on file   Food Insecurity:     Worried About Running Out of Food in the Last Year: Not on file    Hector of Food in the Last Year: Not on file   Transportation Needs:     Lack of Transportation (Medical): Not on file    Lack of Transportation (Non-Medical):  Not on file   Physical Activity:     Days of Exercise per Week: Not on file    Minutes of Exercise per Session: Not on file   Stress:     Feeling of Stress : Not on file   Social Connections:     Frequency of Communication with Friends and Family: Not on file    Frequency of Social Gatherings with Friends and Family: Not on file    Attends Druze Services: Not on file    Active Member of Clubs or Organizations: Not on file    Attends Club or Organization Meetings: Not on file    Marital Status: Not on file   Intimate Partner Violence:     Fear of Current or Ex-Partner: Not on file    Emotionally Abused: Not on file    Physically Abused: Not on file    Sexually Abused: Not on file   Housing Stability:     Unable to Pay for Housing in the Last Year: Not on file    Number of [place] in the Last Year: Not on file    Unstable Housing in the Last Year: Not on file      FAMILY HISTORY:   No family history on file. HOSPITALIZATION COURSE:    Sekou Lemons was admitted to the inpatient psychiatric unit [facility] for acute psychiatric stabilization in regards to symptomatology as described in the HPI above. The differential diagnosis at time of admission included: MDD vs adjustment disorder. While on the unit Sekou Lemons was involved in individual, group, occupational and milieu therapy. Psychiatric medications were adjusted during this hospitalization including Neurontin, Klonopin and Buspar. Sekou Lemons demonstrated a slow, but progressive improvement in overall condition. Much of patient's initial presentation appeared to be related to situational stressors and psychological factors. Please see individual progress notes for more specific details regarding patient's hospitalization course. Patient with request for discharge today. There are no grounds to seek a TDO. At time of discharge, Sekou Lemons is without significant problems of depression, psychosis, or michael. Patient free of suicidal and homicidal ideations (appears to be at very low risk of suicide or homicide) and reports many positive predictive factors in terms of not attempting suicide or homicide. Overall presentation at time of discharge is most consistent with the diagnosis of MDD and [place]. Patient has maximized benefit to be derived from acute inpatient psychiatric treatment.   All members of the treatment team concur with each other in regards to plans for discharge today. Patient and family are aware and in agreement with discharge and discharge plan. LABS AND IMAGAING:    Labs Reviewed   CBC WITH AUTOMATED DIFF - Abnormal; Notable for the following components:       Result Value    RDW 14.7 (*)     All other components within normal limits   METABOLIC PANEL, COMPREHENSIVE - Abnormal; Notable for the following components:    BUN/Creatinine ratio 7 (*)     ALT (SGPT) 171 (*)     AST (SGOT) 64 (*)     Alk. phosphatase 151 (*)     A-G Ratio 0.9 (*)     All other components within normal limits   ACETAMINOPHEN - Abnormal; Notable for the following components:    Acetaminophen level <2 (*)     All other components within normal limits   COVID-19 WITH INFLUENZA A/B   ETHYL ALCOHOL   SALICYLATE   URINALYSIS W/ RFLX MICROSCOPIC   DRUG SCREEN, URINE     No results found for: DS35, PHEN, PHENO, PHENT, DILF, DS39, PHENY, PTN, VALF2, VALAC, VALP, VALPR, DS6, CRBAM, CRBAMP, CARB2, XCRBAM  Admission on 06/08/2022, Discharged on 06/10/2022   Component Date Value Ref Range Status    WBC 06/08/2022 7.7  3.6 - 11.0 K/uL Final    RBC 06/08/2022 4.62  3.80 - 5.20 M/uL Final    HGB 06/08/2022 13.8  11.5 - 16.0 g/dL Final    HCT 06/08/2022 41.0  35.0 - 47.0 % Final    MCV 06/08/2022 88.7  80.0 - 99.0 FL Final    MCH 06/08/2022 29.9  26.0 - 34.0 PG Final    MCHC 06/08/2022 33.7  30.0 - 36.5 g/dL Final    RDW 06/08/2022 14.7* 11.5 - 14.5 % Final    PLATELET 99/90/6021 219  150 - 400 K/uL Final    MPV 06/08/2022 8.9  8.9 - 12.9 FL Final    NRBC 06/08/2022 0.0  0  WBC Final    ABSOLUTE NRBC 06/08/2022 0.00  0.00 - 0.01 K/uL Final    NEUTROPHILS 06/08/2022 50  32 - 75 % Final    LYMPHOCYTES 06/08/2022 45  12 - 49 % Final    MONOCYTES 06/08/2022 5  5 - 13 % Final    EOSINOPHILS 06/08/2022 0  0 - 7 % Final    BASOPHILS 06/08/2022 0  0 - 1 % Final    IMMATURE GRANULOCYTES 06/08/2022 0  0.0 - 0.5 % Final    ABS.  NEUTROPHILS 06/08/2022 3.8  1.8 - 8.0 K/UL Final  ABS. LYMPHOCYTES 06/08/2022 3.5  0.8 - 3.5 K/UL Final    ABS. MONOCYTES 06/08/2022 0.4  0.0 - 1.0 K/UL Final    ABS. EOSINOPHILS 06/08/2022 0.0  0.0 - 0.4 K/UL Final    ABS. BASOPHILS 06/08/2022 0.0  0.0 - 0.1 K/UL Final    ABS. IMM. GRANS. 06/08/2022 0.0  0.00 - 0.04 K/UL Final    DF 06/08/2022 MANUAL    Final    RBC COMMENTS 06/08/2022 NORMOCYTIC, NORMOCHROMIC    Final    Sodium 06/08/2022 140  136 - 145 mmol/L Final    Potassium 06/08/2022 3.9  3.5 - 5.1 mmol/L Final    Chloride 06/08/2022 105  97 - 108 mmol/L Final    CO2 06/08/2022 26  21 - 32 mmol/L Final    Anion gap 06/08/2022 9  5 - 15 mmol/L Final    Glucose 06/08/2022 89  65 - 100 mg/dL Final    BUN 06/08/2022 6  6 - 20 MG/DL Final    Creatinine 06/08/2022 0.84  0.55 - 1.02 MG/DL Final    BUN/Creatinine ratio 06/08/2022 7* 12 - 20   Final    GFR est AA 06/08/2022 >60  >60 ml/min/1.73m2 Final    GFR est non-AA 06/08/2022 >60  >60 ml/min/1.73m2 Final    Calcium 06/08/2022 9.0  8.5 - 10.1 MG/DL Final    Bilirubin, total 06/08/2022 0.4  0.2 - 1.0 MG/DL Final    ALT (SGPT) 06/08/2022 171* 12 - 78 U/L Final    AST (SGOT) 06/08/2022 64* 15 - 37 U/L Final    Alk.  phosphatase 06/08/2022 151* 45 - 117 U/L Final    Protein, total 06/08/2022 7.4  6.4 - 8.2 g/dL Final    Albumin 06/08/2022 3.6  3.5 - 5.0 g/dL Final    Globulin 06/08/2022 3.8  2.0 - 4.0 g/dL Final    A-G Ratio 06/08/2022 0.9* 1.1 - 2.2   Final    ALCOHOL(ETHYL),SERUM 06/08/2022 <10  <10 MG/DL Corrected    Acetaminophen level 06/08/2022 <2* 10 - 30 ug/mL Final    Salicylate level 42/65/8467 5.3  2.8 - 20.0 MG/DL Final    SARS-CoV-2 by PCR 06/08/2022 Not detected  NOTD   Final    Influenza A by PCR 06/08/2022 Not detected  NOTD   Final    Influenza B by PCR 06/08/2022 Not detected  NOTD   Final    Color 06/08/2022 YELLOW/STRAW    Final    Appearance 06/08/2022 CLEAR  CLEAR   Final    Specific gravity 06/08/2022 <1.005  1.003 - 1.030 Final    pH (UA) 06/08/2022 6.5 5.0 - 8.0   Final    Protein 06/08/2022 Negative  NEG mg/dL Final    Glucose 06/08/2022 Negative  NEG mg/dL Final    Ketone 06/08/2022 Negative  NEG mg/dL Final    Bilirubin 06/08/2022 Negative  NEG   Final    Blood 06/08/2022 Negative  NEG   Final    Urobilinogen 06/08/2022 0.2  0.2 - 1.0 EU/dL Final    Nitrites 06/08/2022 Negative  NEG   Final    Leukocyte Esterase 06/08/2022 Negative  NEG   Final    AMPHETAMINES 06/08/2022 Negative  NEG   Final    BARBITURATES 06/08/2022 Negative  NEG   Final    BENZODIAZEPINES 06/08/2022 Negative  NEG   Final    COCAINE 06/08/2022 Negative  NEG   Final    METHADONE 06/08/2022 Negative  NEG   Final    OPIATES 06/08/2022 Negative  NEG   Final    PCP(PHENCYCLIDINE) 06/08/2022 Negative  NEG   Final    THC (TH-CANNABINOL) 06/08/2022 Negative  NEG   Final    Drug screen comment 06/08/2022 (NOTE)   Final     No results found. DISPOSITION:    Home. Patient to f/u with psychiatric and psychotherapy appointments. FOLLOW-UP CARE:    Activity as tolerated  Regular diet  Wound Care: none needed. Follow-up Information     Follow up With Specialties Details Why Contact Info    Javier Leal MD Family Medicine Go to As needed [address]   [address]  [address] [address]  [address] Counseling  Go on 7/8/2022  11:30am For medication management Guille Ng [address]  [address]  [place], [address]  [phone]                 PROGNOSIS:   Paulie Cam ---- based on nature of patient's pathology/ies and treatment compliance issues. Prognosis is greatly dependent upon patient's ability to remain sober and to follow up with scheduled appointments as well as to comply with psychiatric medications as prescribed.             DISCHARGE MEDICATIONS:     Informed consent given for the use of following psychotropic medications:  Discharge Medication List as of 6/10/2022  3:02 PM      START taking these medications    Details   busPIRone (BUSPAR) 5 mg tablet Take 1 Tablet by mouth three (3) times daily. , Normal, Disp-90 Tablet, R-1      gabapentin (Neurontin) 100 mg capsule Take 1 to 2 capsules by mouth twice daily as needed for panic / anxiety. Indications: Anxiety, Normal, Disp-60 Capsule, R-1      traZODone (DESYREL) 50 mg tablet Take 1 Tablet by mouth nightly as needed for Sleep (For insomnia). , Normal, Disp-30 Tablet, R-1         CONTINUE these medications which have CHANGED    Details   clonazePAM (KlonoPIN) 0.5 mg tablet Take 1 Tablet by mouth two (2) times daily as needed (Refractory anxiety). Max Daily Amount: 1 mg. Take at least 30 minutes after gabapentin, Normal, Disp-60 Tablet, R-1      citalopram (CELEXA) 40 mg tablet Take 1 Tablet by mouth daily. , Normal, Disp-30 Tablet, R-1      pantoprazole (PROTONIX) 40 mg tablet Take 1 Tablet by mouth daily. Indications: gastroesophageal reflux disease, Normal, Disp-30 Tablet, R-1                    A coordinated, multidisplinary treatment team round was conducted with Damion Fuse is done daily here at [facility]. This team consists of the nurse, psychiatric unit pharmacist,  and Gavino Espinosa. I have spent greater than 35 minutes on discharge work.     Signed:  Tasia Rowan MD  6/10/2022

## 2023-04-06 ENCOUNTER — TRANSCRIBE ORDER (OUTPATIENT)
Dept: SCHEDULING | Age: 63
End: 2023-04-06

## 2023-04-07 ENCOUNTER — TRANSCRIBE ORDER (OUTPATIENT)
Dept: SCHEDULING | Age: 63
End: 2023-04-07

## 2023-05-04 ENCOUNTER — HOSPITAL ENCOUNTER (OUTPATIENT)
Dept: CT IMAGING | Age: 63
Discharge: HOME | End: 2023-05-04
Payer: COMMERCIAL

## 2023-05-04 DIAGNOSIS — Z12.2 ENCOUNTER FOR SCREENING FOR LUNG CANCER: ICD-10-CM

## 2023-05-04 DIAGNOSIS — F17.200 SMOKER: ICD-10-CM

## 2023-05-04 PROCEDURE — 71271 CT THORAX LUNG CANCER SCR C-: CPT

## 2025-04-24 ENCOUNTER — TRANSCRIBE ORDERS (OUTPATIENT)
Facility: HOSPITAL | Age: 65
End: 2025-04-24

## 2025-04-24 DIAGNOSIS — R19.4 CHANGE IN BOWEL HABITS: ICD-10-CM

## 2025-04-24 DIAGNOSIS — R10.31 ABDOMINAL PAIN, RIGHT LOWER QUADRANT: ICD-10-CM

## 2025-04-24 DIAGNOSIS — K92.1 BLOOD IN STOOL: ICD-10-CM

## 2025-04-24 DIAGNOSIS — K64.9 BLEEDING HEMORRHOIDS: ICD-10-CM

## 2025-04-24 DIAGNOSIS — K21.9 GASTROESOPHAGEAL REFLUX DISEASE WITHOUT ESOPHAGITIS: ICD-10-CM

## 2025-04-24 DIAGNOSIS — R10.13 DYSPEPSIA: ICD-10-CM

## 2025-04-24 DIAGNOSIS — R10.32 ABDOMINAL PAIN, LEFT LOWER QUADRANT: Primary | ICD-10-CM

## 2025-04-24 DIAGNOSIS — R11.0 NAUSEA: ICD-10-CM

## 2025-05-01 ENCOUNTER — HOSPITAL ENCOUNTER (OUTPATIENT)
Facility: HOSPITAL | Age: 65
Discharge: HOME | End: 2025-05-01
Payer: MEDICARE

## 2025-05-01 DIAGNOSIS — R10.13 DYSPEPSIA: ICD-10-CM

## 2025-05-01 DIAGNOSIS — K92.1 BLOOD IN STOOL: ICD-10-CM

## 2025-05-01 DIAGNOSIS — R10.31 ABDOMINAL PAIN, RIGHT LOWER QUADRANT: ICD-10-CM

## 2025-05-01 DIAGNOSIS — R19.4 CHANGE IN BOWEL HABITS: ICD-10-CM

## 2025-05-01 DIAGNOSIS — R10.32 ABDOMINAL PAIN, LEFT LOWER QUADRANT: ICD-10-CM

## 2025-05-01 DIAGNOSIS — K21.9 GASTROESOPHAGEAL REFLUX DISEASE WITHOUT ESOPHAGITIS: ICD-10-CM

## 2025-05-01 DIAGNOSIS — K64.9 BLEEDING HEMORRHOIDS: ICD-10-CM

## 2025-05-01 DIAGNOSIS — R11.0 NAUSEA: ICD-10-CM

## 2025-05-01 PROCEDURE — 6360000004 HC RX CONTRAST MEDICATION: Performed by: PHYSICIAN ASSISTANT

## 2025-05-01 PROCEDURE — 74177 CT ABD & PELVIS W/CONTRAST: CPT

## 2025-05-01 RX ORDER — IOPAMIDOL 755 MG/ML
100 INJECTION, SOLUTION INTRAVASCULAR
Status: COMPLETED | OUTPATIENT
Start: 2025-05-01 | End: 2025-05-01

## 2025-05-01 RX ADMIN — IOPAMIDOL 100 ML: 755 INJECTION, SOLUTION INTRAVENOUS at 08:28
